# Patient Record
Sex: FEMALE | Race: WHITE | Employment: OTHER | URBAN - METROPOLITAN AREA
[De-identification: names, ages, dates, MRNs, and addresses within clinical notes are randomized per-mention and may not be internally consistent; named-entity substitution may affect disease eponyms.]

---

## 2017-02-07 ENCOUNTER — TELEPHONE (OUTPATIENT)
Dept: FAMILY MEDICINE CLINIC | Facility: CLINIC | Age: 57
End: 2017-02-07

## 2017-02-14 ENCOUNTER — OFFICE VISIT (OUTPATIENT)
Dept: FAMILY MEDICINE CLINIC | Facility: CLINIC | Age: 57
End: 2017-02-14

## 2017-02-14 ENCOUNTER — HOSPITAL ENCOUNTER (OUTPATIENT)
Age: 57
Setting detail: SPECIMEN
Discharge: HOME | End: 2017-02-14
Payer: COMMERCIAL

## 2017-02-14 VITALS
HEART RATE: 63 BPM | DIASTOLIC BLOOD PRESSURE: 80 MMHG | WEIGHT: 126 LBS | SYSTOLIC BLOOD PRESSURE: 120 MMHG | HEIGHT: 66 IN | BODY MASS INDEX: 20.25 KG/M2

## 2017-02-14 DIAGNOSIS — R53.82 CHRONIC FATIGUE: ICD-10-CM

## 2017-02-14 DIAGNOSIS — R42 ORTHOSTATIC LIGHTHEADEDNESS: ICD-10-CM

## 2017-02-14 DIAGNOSIS — E87.6 HYPOKALEMIA: Primary | ICD-10-CM

## 2017-02-14 PROCEDURE — 36415 COLL VENOUS BLD VENIPUNCTURE: CPT | Performed by: FAMILY MEDICINE

## 2017-02-14 PROCEDURE — 99214 OFFICE O/P EST MOD 30 MIN: CPT | Performed by: FAMILY MEDICINE

## 2017-02-14 ASSESSMENT — ENCOUNTER SYMPTOMS
BLOOD IN STOOL: 0
HEARTBURN: 1
COUGH: 0
EYES NEGATIVE: 1
SHORTNESS OF BREATH: 0
CONSTIPATION: 0
NAUSEA: 1
VOMITING: 0
ABDOMINAL PAIN: 0

## 2017-02-15 LAB
ALBUMIN SERPL-MCNC: 5 G/DL (ref 3.5–5.2)
ALBUMIN/GLOBULIN RATIO: 3.1 (ref 1–2.5)
ALP BLD-CCNC: 51 U/L (ref 35–104)
ALT SERPL-CCNC: 17 U/L (ref 5–33)
ANION GAP SERPL CALCULATED.3IONS-SCNC: 16 MMOL/L (ref 9–17)
AST SERPL-CCNC: 24 U/L
BILIRUB SERPL-MCNC: 0.56 MG/DL (ref 0.3–1.2)
BUN BLDV-MCNC: 12 MG/DL (ref 6–20)
BUN/CREAT BLD: ABNORMAL (ref 9–20)
CALCIUM SERPL-MCNC: 9.8 MG/DL (ref 8.6–10.4)
CHLORIDE BLD-SCNC: 101 MMOL/L (ref 98–107)
CO2: 25 MMOL/L (ref 20–31)
CREAT SERPL-MCNC: 0.81 MG/DL (ref 0.5–0.9)
GFR AFRICAN AMERICAN: >60 ML/MIN
GFR NON-AFRICAN AMERICAN: >60 ML/MIN
GFR SERPL CREATININE-BSD FRML MDRD: ABNORMAL ML/MIN/{1.73_M2}
GFR SERPL CREATININE-BSD FRML MDRD: ABNORMAL ML/MIN/{1.73_M2}
GLUCOSE BLD-MCNC: 89 MG/DL (ref 70–99)
POTASSIUM SERPL-SCNC: 4.1 MMOL/L (ref 3.7–5.3)
SODIUM BLD-SCNC: 142 MMOL/L (ref 135–144)
TOTAL PROTEIN: 6.6 G/DL (ref 6.4–8.3)
VITAMIN B-12: 872 PG/ML (ref 211–946)

## 2017-02-16 LAB — VITAMIN D 1,25-DIHYDROXY: 42.4 PG/ML (ref 19.9–79.3)

## 2017-02-23 ENCOUNTER — OFFICE VISIT (OUTPATIENT)
Dept: FAMILY MEDICINE CLINIC | Facility: CLINIC | Age: 57
End: 2017-02-23

## 2017-02-23 ENCOUNTER — HOSPITAL ENCOUNTER (OUTPATIENT)
Age: 57
Setting detail: SPECIMEN
Discharge: HOME | End: 2017-02-23
Payer: COMMERCIAL

## 2017-02-23 VITALS
DIASTOLIC BLOOD PRESSURE: 84 MMHG | WEIGHT: 125 LBS | HEIGHT: 66 IN | BODY MASS INDEX: 20.09 KG/M2 | HEART RATE: 76 BPM | SYSTOLIC BLOOD PRESSURE: 122 MMHG

## 2017-02-23 DIAGNOSIS — R58 BLEEDING: ICD-10-CM

## 2017-02-23 DIAGNOSIS — K64.9 HEMORRHOIDS, UNSPECIFIED HEMORRHOID TYPE: ICD-10-CM

## 2017-02-23 DIAGNOSIS — B37.31 VAGINAL CANDIDIASIS: Primary | ICD-10-CM

## 2017-02-23 LAB
ABSOLUTE EOS #: 0 K/UL (ref 0–0.4)
ABSOLUTE LYMPH #: 1.9 K/UL (ref 1–4.8)
ABSOLUTE MONO #: 0.3 K/UL (ref 0.1–1.2)
BASOPHILS # BLD: 0 % (ref 0–2)
BASOPHILS ABSOLUTE: 0 K/UL (ref 0–0.2)
DIFFERENTIAL TYPE: NORMAL
EOSINOPHILS RELATIVE PERCENT: 1 % (ref 1–4)
HCT VFR BLD CALC: 41 % (ref 36–46)
HEMOGLOBIN: 14 G/DL (ref 12–16)
LYMPHOCYTES # BLD: 31 % (ref 24–44)
MCH RBC QN AUTO: 31.4 PG (ref 26–34)
MCHC RBC AUTO-ENTMCNC: 34.1 G/DL (ref 31–37)
MCV RBC AUTO: 92.3 FL (ref 80–100)
MONOCYTES # BLD: 4 % (ref 2–11)
PDW BLD-RTO: 13.7 % (ref 12.5–15.4)
PLATELET # BLD: 239 K/UL (ref 140–450)
PLATELET ESTIMATE: NORMAL
PMV BLD AUTO: 8.9 FL (ref 6–12)
RBC # BLD: 4.45 M/UL (ref 4–5.2)
RBC # BLD: NORMAL 10*6/UL
SEG NEUTROPHILS: 64 % (ref 36–66)
SEGMENTED NEUTROPHILS ABSOLUTE COUNT: 4 K/UL (ref 1.8–7.7)
WBC # BLD: 6.3 K/UL (ref 3.5–11)
WBC # BLD: NORMAL 10*3/UL

## 2017-02-23 PROCEDURE — 99213 OFFICE O/P EST LOW 20 MIN: CPT | Performed by: NURSE PRACTITIONER

## 2017-02-23 PROCEDURE — 36415 COLL VENOUS BLD VENIPUNCTURE: CPT | Performed by: NURSE PRACTITIONER

## 2017-02-23 RX ORDER — CHOLECALCIFEROL (VITAMIN D3) 125 MCG
500 CAPSULE ORAL DAILY
COMMUNITY
End: 2017-08-10 | Stop reason: ALTCHOICE

## 2017-02-23 RX ORDER — FLUCONAZOLE 100 MG/1
100 TABLET ORAL
Qty: 2 TABLET | Refills: 0 | Status: SHIPPED | OUTPATIENT
Start: 2017-02-23 | End: 2018-01-15 | Stop reason: SDUPTHER

## 2017-02-23 ASSESSMENT — ENCOUNTER SYMPTOMS: RESPIRATORY NEGATIVE: 1

## 2017-06-19 ENCOUNTER — OFFICE VISIT (OUTPATIENT)
Dept: OBGYN CLINIC | Age: 57
End: 2017-06-19
Payer: COMMERCIAL

## 2017-06-19 VITALS
BODY MASS INDEX: 20.63 KG/M2 | SYSTOLIC BLOOD PRESSURE: 112 MMHG | DIASTOLIC BLOOD PRESSURE: 80 MMHG | WEIGHT: 128.4 LBS | HEIGHT: 66 IN

## 2017-06-19 DIAGNOSIS — N95.2 VAGINAL ATROPHY: ICD-10-CM

## 2017-06-19 DIAGNOSIS — N95.0 PMB (POSTMENOPAUSAL BLEEDING): Primary | ICD-10-CM

## 2017-06-19 PROCEDURE — 99213 OFFICE O/P EST LOW 20 MIN: CPT | Performed by: NURSE PRACTITIONER

## 2017-08-10 ENCOUNTER — OFFICE VISIT (OUTPATIENT)
Dept: OBGYN CLINIC | Age: 57
End: 2017-08-10
Payer: COMMERCIAL

## 2017-08-10 VITALS
HEIGHT: 65 IN | WEIGHT: 127 LBS | DIASTOLIC BLOOD PRESSURE: 80 MMHG | BODY MASS INDEX: 21.16 KG/M2 | SYSTOLIC BLOOD PRESSURE: 116 MMHG

## 2017-08-10 DIAGNOSIS — Z12.31 ENCOUNTER FOR SCREENING MAMMOGRAM FOR MALIGNANT NEOPLASM OF BREAST: Primary | ICD-10-CM

## 2017-08-10 PROCEDURE — 99396 PREV VISIT EST AGE 40-64: CPT | Performed by: OBSTETRICS & GYNECOLOGY

## 2017-08-10 RX ORDER — METRONIDAZOLE 7.5 MG/G
GEL TOPICAL 2 TIMES DAILY
COMMUNITY
Start: 2017-05-22 | End: 2021-04-01

## 2017-08-10 ASSESSMENT — ENCOUNTER SYMPTOMS
ANAL BLEEDING: 0
ABDOMINAL DISTENTION: 0
WHEEZING: 0
BACK PAIN: 0
SHORTNESS OF BREATH: 0
CHEST TIGHTNESS: 0
NAUSEA: 0
BLOOD IN STOOL: 0
RECTAL PAIN: 0
PHOTOPHOBIA: 0
COUGH: 0
TROUBLE SWALLOWING: 0
APNEA: 0
CONSTIPATION: 0
SORE THROAT: 0
DIARRHEA: 0
ABDOMINAL PAIN: 0

## 2017-08-18 LAB
ALBUMIN SERPL-MCNC: 4.3 G/DL
ALP BLD-CCNC: 49 U/L
ALT SERPL-CCNC: 37 U/L
ANION GAP SERPL CALCULATED.3IONS-SCNC: NORMAL MMOL/L
AST SERPL-CCNC: 29 U/L
BASOPHILS ABSOLUTE: 0.05 /ΜL
BASOPHILS RELATIVE PERCENT: 1 %
BILIRUB SERPL-MCNC: 0.6 MG/DL (ref 0.1–1.4)
BUN BLDV-MCNC: 14 MG/DL
CALCIUM SERPL-MCNC: 9.4 MG/DL
CHLORIDE BLD-SCNC: 105 MMOL/L
CO2: 27 MMOL/L
CREAT SERPL-MCNC: 0.89 MG/DL
EOSINOPHILS ABSOLUTE: 0.16 /ΜL
EOSINOPHILS RELATIVE PERCENT: 3 %
GFR CALCULATED: 64.9
GLUCOSE BLD-MCNC: 81 MG/DL
HCT VFR BLD CALC: 41.3 % (ref 36–46)
HEMOGLOBIN: 14.2 G/DL (ref 12–16)
LYMPHOCYTES ABSOLUTE: 2.4 /ΜL
LYMPHOCYTES RELATIVE PERCENT: 44 %
MCH RBC QN AUTO: 31.4 PG
MCHC RBC AUTO-ENTMCNC: 34.3 G/DL
MCV RBC AUTO: 91.3 FL
MONOCYTES ABSOLUTE: 0.27 /ΜL
MONOCYTES RELATIVE PERCENT: 5 %
NEUTROPHILS ABSOLUTE: 2.57 /ΜL
NEUTROPHILS RELATIVE PERCENT: 47 %
PDW BLD-RTO: NORMAL %
PLATELET # BLD: 264 K/ΜL
PMV BLD AUTO: NORMAL FL
POTASSIUM SERPL-SCNC: 4.3 MMOL/L
RBC # BLD: 4.53 10^6/ΜL
SODIUM BLD-SCNC: 142 MMOL/L
TOTAL PROTEIN: 6.7
TSH SERPL DL<=0.05 MIU/L-ACNC: 2.01 UIU/ML
VITAMIN D 25-HYDROXY: 28.9
VITAMIN D2, 25 HYDROXY: NORMAL
VITAMIN D3,25 HYDROXY: NORMAL
WBC # BLD: 5.46 10^3/ML

## 2017-08-21 DIAGNOSIS — Z12.31 ENCOUNTER FOR SCREENING MAMMOGRAM FOR MALIGNANT NEOPLASM OF BREAST: ICD-10-CM

## 2017-09-11 ENCOUNTER — HOSPITAL ENCOUNTER (OUTPATIENT)
Dept: MAMMOGRAPHY | Age: 57
Discharge: HOME | End: 2017-09-11
Payer: COMMERCIAL

## 2017-09-11 DIAGNOSIS — Z12.31 ENCOUNTER FOR SCREENING MAMMOGRAM FOR MALIGNANT NEOPLASM OF BREAST: ICD-10-CM

## 2017-09-11 PROCEDURE — 77063 BREAST TOMOSYNTHESIS BI: CPT

## 2017-12-18 ENCOUNTER — OFFICE VISIT (OUTPATIENT)
Dept: FAMILY MEDICINE CLINIC | Age: 57
End: 2017-12-18
Payer: COMMERCIAL

## 2017-12-18 VITALS
BODY MASS INDEX: 20.41 KG/M2 | WEIGHT: 127 LBS | HEART RATE: 88 BPM | HEIGHT: 66 IN | TEMPERATURE: 99.6 F | DIASTOLIC BLOOD PRESSURE: 72 MMHG | SYSTOLIC BLOOD PRESSURE: 108 MMHG

## 2017-12-18 DIAGNOSIS — Z72.89 OTHER PROBLEMS RELATED TO LIFESTYLE: ICD-10-CM

## 2017-12-18 DIAGNOSIS — Z13.228 SCREENING FOR METABOLIC DISORDER: ICD-10-CM

## 2017-12-18 DIAGNOSIS — E87.6 HYPOKALEMIA: ICD-10-CM

## 2017-12-18 DIAGNOSIS — Z13.220 SCREENING FOR HYPERLIPIDEMIA: ICD-10-CM

## 2017-12-18 DIAGNOSIS — T36.95XA ANTIBIOTIC-INDUCED YEAST INFECTION: ICD-10-CM

## 2017-12-18 DIAGNOSIS — J01.10 ACUTE NON-RECURRENT FRONTAL SINUSITIS: Primary | ICD-10-CM

## 2017-12-18 DIAGNOSIS — B37.9 ANTIBIOTIC-INDUCED YEAST INFECTION: ICD-10-CM

## 2017-12-18 PROCEDURE — 99213 OFFICE O/P EST LOW 20 MIN: CPT | Performed by: NURSE PRACTITIONER

## 2017-12-18 PROCEDURE — G8427 DOCREV CUR MEDS BY ELIG CLIN: HCPCS | Performed by: NURSE PRACTITIONER

## 2017-12-18 PROCEDURE — G8484 FLU IMMUNIZE NO ADMIN: HCPCS | Performed by: NURSE PRACTITIONER

## 2017-12-18 PROCEDURE — 3014F SCREEN MAMMO DOC REV: CPT | Performed by: NURSE PRACTITIONER

## 2017-12-18 PROCEDURE — 3017F COLORECTAL CA SCREEN DOC REV: CPT | Performed by: NURSE PRACTITIONER

## 2017-12-18 PROCEDURE — G8420 CALC BMI NORM PARAMETERS: HCPCS | Performed by: NURSE PRACTITIONER

## 2017-12-18 PROCEDURE — 1036F TOBACCO NON-USER: CPT | Performed by: NURSE PRACTITIONER

## 2017-12-18 RX ORDER — DOXYCYCLINE HYCLATE 100 MG
100 TABLET ORAL 2 TIMES DAILY
Qty: 20 TABLET | Refills: 0 | Status: SHIPPED | OUTPATIENT
Start: 2017-12-18 | End: 2018-01-15 | Stop reason: SDUPTHER

## 2017-12-18 RX ORDER — FLUCONAZOLE 100 MG/1
100 TABLET ORAL
Qty: 2 TABLET | Refills: 1 | Status: SHIPPED | OUTPATIENT
Start: 2017-12-18 | End: 2017-12-22

## 2017-12-18 ASSESSMENT — ENCOUNTER SYMPTOMS
SHORTNESS OF BREATH: 0
SINUS PRESSURE: 1
SINUS COMPLAINT: 1
SORE THROAT: 1
SINUS PAIN: 1
COUGH: 1

## 2017-12-18 NOTE — PROGRESS NOTES
Date    Acid reflux     Fracture of thumb     20's    Hypokalemia        FAMILY HISTORY    Family History   Problem Relation Age of Onset    Heart Disease Mother      CABGx 4 , [place]   Lilli March Hypertension Mother    Lilli March Arthritis Mother     Dementia Mother     Osteoporosis Mother     Cervical Cancer Maternal Grandmother     Cancer Paternal Grandfather      throat    Hypertension Brother     Heart Disease Brother     Asthma Brother        SOCIAL HISTORY    Social History     Social History    Marital status:      Spouse name: N/A    Number of children: N/A    Years of education: N/A     Social History Main Topics    Smoking status: Never Smoker    Smokeless tobacco: Never Used    Alcohol use Yes    Drug use: No    Sexual activity: Yes     Birth control/ protection: Surgical      Comment: hyst     Other Topics Concern    None     Social History Narrative    None       SURGICAL HISTORY    Past Surgical History:   Procedure Laterality Date    BLADDER REMOVAL  1986    x2    BREAST ENHANCEMENT SURGERY Bilateral      implants placed under muscle    CATARACT REMOVAL Bilateral 10/2017     SECTION  1984, 1989    x 2    DILATION AND CURETTAGE OF UTERUS  1981    miscarriages    DILATION AND CURETTAGE OF UTERUS  1998    dsyplasia    HERNIA REPAIR      HYSTERECTOMY, TOTAL ABDOMINAL  1998    RHINOPLASTY  1998    TONSILLECTOMY AND ADENOIDECTOMY      1 yo       CURRENT MEDICATIONS    Current Outpatient Prescriptions   Medication Sig Dispense Refill    Omega-3 Fatty Acids (OMEGA 3 PO) Take by mouth      Probiotic Product (PROBIOTIC PO) Take by mouth      COLLAGEN PO Take by mouth      doxycycline hyclate (VIBRA-TABS) 100 MG tablet Take 1 tablet by mouth 2 times daily 20 tablet 0    fluconazole (DIFLUCAN) 100 MG tablet Take 1 tablet by mouth every 48 hours for 4 days 2 tablet 1    metroNIDAZOLE (METROGEL) 0.75 % gel       Potassium 99 MG TABS Take by mouth      Ibuprofen (ADVIL PO) Take 600 mg by mouth 3 times daily       Magnesium Citrate 100 MG TABS Take by mouth      aspirin 81 MG tablet Take 81 mg by mouth daily      Multiple Vitamins-Minerals (THERAPEUTIC MULTIVITAMIN-MINERALS) tablet Take 1 tablet by mouth daily       No current facility-administered medications for this visit. ALLERGIES    Allergies   Allergen Reactions    Macrodantin [Nitrofurantoin] Hives    Sulfa Antibiotics Hives and Other (See Comments)     Itchy throat       PHYSICAL EXAM   Physical Exam   Constitutional: She is oriented to person, place, and time. She appears well-developed and well-nourished. HENT:   Head: Normocephalic and atraumatic. Right Ear: Hearing normal. A middle ear effusion is present. Left Ear: Hearing and tympanic membrane normal.   Nose: Right sinus exhibits frontal sinus tenderness. Left sinus exhibits maxillary sinus tenderness and frontal sinus tenderness. Mouth/Throat: Uvula is midline. Posterior oropharyngeal erythema (mild, visible PND) present. R TM view partially obstructed by cerumen; mild effusion noted   Eyes: Pupils are equal, round, and reactive to light. Neck: Normal range of motion. Cardiovascular: Normal rate, regular rhythm and normal heart sounds. Pulmonary/Chest: Effort normal and breath sounds normal.   Musculoskeletal: Normal range of motion. Lymphadenopathy:     She has no cervical adenopathy. Neurological: She is alert and oriented to person, place, and time. Skin: Skin is warm and dry. Nursing note and vitals reviewed. Assessment/PLan  1. Acute non-recurrent frontal sinusitis    -rest, fluids, humidified air, saline nasal spray  - doxycycline hyclate (VIBRA-TABS) 100 MG tablet; Take 1 tablet by mouth 2 times daily  Dispense: 20 tablet; Refill: 0    2. Antibiotic-induced yeast infection    - fluconazole (DIFLUCAN) 100 MG tablet; Take 1 tablet by mouth every 48 hours for 4 days  Dispense: 2 tablet; Refill: 1    3.  Hypokalemia    - Comprehensive Metabolic Panel; Future    4. Screening for metabolic disorder    - Comprehensive Metabolic Panel; Future    5. Other problems related to lifestyle    - Hepatitis C Antibody; Future    6. Screening for hyperlipidemia    - Lipid, Fasting; Future      Louis Allred received counseling on the following healthy behaviors: nutrition, exercise and medication adherence  Reviewed prior labs and health maintenance. Continue current medications, diet and exercise. Discussed use, benefit, and side effects of prescribed medications. Barriers to medication compliance addressed. Patient given educational materials - see patient instructions. All patient questions answered. Patient voiced understanding. Return if symptoms worsen or fail to improve.         Electronically signed by Juana Shaw NP on 12/18/17 at 11:20 AM

## 2018-01-15 ENCOUNTER — PATIENT MESSAGE (OUTPATIENT)
Dept: FAMILY MEDICINE CLINIC | Age: 58
End: 2018-01-15

## 2018-01-15 DIAGNOSIS — J01.10 ACUTE NON-RECURRENT FRONTAL SINUSITIS: ICD-10-CM

## 2018-01-15 DIAGNOSIS — B37.31 VAGINAL CANDIDIASIS: ICD-10-CM

## 2018-01-15 RX ORDER — FLUCONAZOLE 100 MG/1
100 TABLET ORAL
Qty: 2 TABLET | Refills: 0 | Status: SHIPPED | OUTPATIENT
Start: 2018-01-15 | End: 2018-01-19

## 2018-01-15 RX ORDER — DOXYCYCLINE HYCLATE 100 MG
100 TABLET ORAL 2 TIMES DAILY
Qty: 28 TABLET | Refills: 0 | Status: SHIPPED | OUTPATIENT
Start: 2018-01-15 | End: 2018-08-31 | Stop reason: CLARIF

## 2018-01-18 ENCOUNTER — HOSPITAL ENCOUNTER (OUTPATIENT)
Age: 58
Setting detail: SPECIMEN
Discharge: HOME | End: 2018-01-18
Payer: COMMERCIAL

## 2018-01-18 DIAGNOSIS — Z13.220 SCREENING FOR HYPERLIPIDEMIA: ICD-10-CM

## 2018-01-18 DIAGNOSIS — Z13.228 SCREENING FOR METABOLIC DISORDER: ICD-10-CM

## 2018-01-18 DIAGNOSIS — Z72.89 OTHER PROBLEMS RELATED TO LIFESTYLE: ICD-10-CM

## 2018-01-18 DIAGNOSIS — E87.6 HYPOKALEMIA: ICD-10-CM

## 2018-01-18 LAB
ALBUMIN SERPL-MCNC: 4.7 G/DL (ref 3.5–5.2)
ALBUMIN/GLOBULIN RATIO: 2 (ref 1–2.5)
ALP BLD-CCNC: 51 U/L (ref 35–104)
ALT SERPL-CCNC: 24 U/L (ref 5–33)
ANION GAP SERPL CALCULATED.3IONS-SCNC: 16 MMOL/L (ref 9–17)
AST SERPL-CCNC: 26 U/L
BILIRUB SERPL-MCNC: 0.69 MG/DL (ref 0.3–1.2)
BUN BLDV-MCNC: 13 MG/DL (ref 6–20)
BUN/CREAT BLD: ABNORMAL (ref 9–20)
CALCIUM SERPL-MCNC: 9.8 MG/DL (ref 8.6–10.4)
CHLORIDE BLD-SCNC: 105 MMOL/L (ref 98–107)
CHOLESTEROL, FASTING: 213 MG/DL
CHOLESTEROL/HDL RATIO: 2.5
CO2: 25 MMOL/L (ref 20–31)
CREAT SERPL-MCNC: 0.9 MG/DL (ref 0.5–0.9)
GFR AFRICAN AMERICAN: >60 ML/MIN
GFR NON-AFRICAN AMERICAN: >60 ML/MIN
GFR SERPL CREATININE-BSD FRML MDRD: ABNORMAL ML/MIN/{1.73_M2}
GFR SERPL CREATININE-BSD FRML MDRD: ABNORMAL ML/MIN/{1.73_M2}
GLUCOSE BLD-MCNC: 77 MG/DL (ref 70–99)
HDLC SERPL-MCNC: 85 MG/DL
HEPATITIS C ANTIBODY: NONREACTIVE
LDL CHOLESTEROL: 119 MG/DL (ref 0–130)
POTASSIUM SERPL-SCNC: 4.7 MMOL/L (ref 3.7–5.3)
SODIUM BLD-SCNC: 146 MMOL/L (ref 135–144)
TOTAL PROTEIN: 7.1 G/DL (ref 6.4–8.3)
TRIGLYCERIDE, FASTING: 43 MG/DL
VLDLC SERPL CALC-MCNC: ABNORMAL MG/DL (ref 1–30)

## 2018-02-12 ENCOUNTER — TELEPHONE (OUTPATIENT)
Dept: FAMILY MEDICINE CLINIC | Age: 58
End: 2018-02-12

## 2018-02-12 DIAGNOSIS — Z01.00 EYE EXAM, ROUTINE: Primary | ICD-10-CM

## 2018-03-12 ENCOUNTER — TELEPHONE (OUTPATIENT)
Dept: OBGYN CLINIC | Age: 58
End: 2018-03-12

## 2018-03-12 NOTE — TELEPHONE ENCOUNTER
patient calling for refil on lotrisone she states she has been waiting for this for 2-3 weeks .  Advised we received this on 03/05/18 and not sure why it is stillpending will discuss with Dr Elif Miller when she is back in the office 03/14/18    Please call patient once taken care of [phone]

## 2018-03-14 RX ORDER — CLOTRIMAZOLE AND BETAMETHASONE DIPROPIONATE 10; .64 MG/G; MG/G
CREAM TOPICAL
Qty: 30 G | Refills: 5 | Status: SHIPPED | OUTPATIENT
Start: 2018-03-14 | End: 2018-08-31 | Stop reason: CLARIF

## 2018-04-02 ENCOUNTER — TELEPHONE (OUTPATIENT)
Dept: FAMILY MEDICINE CLINIC | Age: 58
End: 2018-04-02

## 2018-04-02 DIAGNOSIS — D22.62 NEVUS OF LEFT SHOULDER: Primary | ICD-10-CM

## 2018-08-15 ENCOUNTER — OFFICE VISIT (OUTPATIENT)
Dept: OBGYN CLINIC | Age: 58
End: 2018-08-15
Payer: COMMERCIAL

## 2018-08-15 VITALS
WEIGHT: 125.6 LBS | SYSTOLIC BLOOD PRESSURE: 118 MMHG | HEIGHT: 66 IN | DIASTOLIC BLOOD PRESSURE: 80 MMHG | BODY MASS INDEX: 20.18 KG/M2

## 2018-08-15 DIAGNOSIS — Z01.419 WELL FEMALE EXAM WITH ROUTINE GYNECOLOGICAL EXAM: Primary | ICD-10-CM

## 2018-08-15 DIAGNOSIS — N94.19 DYSPAREUNIA DUE TO MEDICAL CONDITION IN FEMALE: ICD-10-CM

## 2018-08-15 DIAGNOSIS — Z12.31 ENCOUNTER FOR SCREENING MAMMOGRAM FOR MALIGNANT NEOPLASM OF BREAST: ICD-10-CM

## 2018-08-15 PROCEDURE — 99396 PREV VISIT EST AGE 40-64: CPT | Performed by: OBSTETRICS & GYNECOLOGY

## 2018-08-15 RX ORDER — CEPHALEXIN 250 MG/1
250 CAPSULE ORAL SEE ADMIN INSTRUCTIONS
Qty: 30 CAPSULE | Refills: 3 | Status: SHIPPED | OUTPATIENT
Start: 2018-08-15 | End: 2018-08-31 | Stop reason: CLARIF

## 2018-08-15 ASSESSMENT — ENCOUNTER SYMPTOMS
RECTAL PAIN: 0
DIARRHEA: 0
WHEEZING: 0
ABDOMINAL DISTENTION: 0
ANAL BLEEDING: 0
CHEST TIGHTNESS: 0
CONSTIPATION: 0
BACK PAIN: 0
APNEA: 0
TROUBLE SWALLOWING: 0
NAUSEA: 0
ABDOMINAL PAIN: 0
SORE THROAT: 0
COUGH: 0
SHORTNESS OF BREATH: 0
PHOTOPHOBIA: 0
BLOOD IN STOOL: 0

## 2018-08-15 NOTE — PROGRESS NOTES
Eric Medina is a 59 y.o. [id number], here for her annual exam.  The patient was seen and examined. The patients past medical, surgical, social and family history were reviewed. Current medications and allergies were reviewed, and documented in the chart. HPI  No new medical problems. No gyn complaints except dyspareunia. Lubricants are not helping and patient is avoiding sex because it is too painful. Tried estrogen cream previously and did not think it helped. Discussed options; patient willing to try Intrarosa and if that is ineffective she will try osphena. She will try the intrarosa for 3 months and will call to request continuing refills or a change. Menopausal history: denies bleeding, pain. Blood pressure 118/80, height 5' 5.75\" (1.67 m), weight 125 lb 9.6 oz (57 kg), not currently breastfeeding.   Wt Readings from Last 3 Encounters:   08/15/18 125 lb 9.6 oz (57 kg)   12/18/17 127 lb (57.6 kg)   08/10/17 127 lb (57.6 kg)     Recent Results (from the past 8736 hour(s))   Vitamin D 25 Hydroxy    Collection Time: 08/18/17 12:00 AM   Result Value Ref Range    Vit D, 25-Hydroxy 28.9     Vitamin D3,25 Hydroxy      Vitamin D2, 25 Hydroxy     TSH with Reflex    Collection Time: 08/18/17 12:00 AM   Result Value Ref Range    TSH 2.01 uIU/mL   CBC With Auto Differential    Collection Time: 08/18/17 12:00 AM   Result Value Ref Range    WBC 5.46 10^3/mL    RBC 4.53 10^6/µL    Hemoglobin 14.2 12.0 - 16.0 g/dL    Hematocrit 41.3 36 - 46 %    MCV 91.3 fL    MCH 31.4 pg    MCHC 34.3 g/dL    Platelets 221 K/µL    RDW  %    MPV  fL    Neutrophils % 47 %    Lymphocytes % 44 %    Monocytes % 5 %    Eosinophils % 3 %    Basophils % 1 %    Neutrophils # 2.57 /µL    Lymphocytes # 2.40 /µL    Monocytes # 0.27 /µL    Eosinophils # 0.16 /µL    Basophils # 0.05 /µL   Comprehensive Metabolic Panel    Collection Time: 08/18/17 12:00 AM   Result Value Ref Range    Sodium 142 mmol/L    Chloride 105 mmol/L    Potassium 4.3 mmol/L    BUN 14 mg/dL    CREATININE 0.89     Glucose 81 mg/dL    AST 29 U/L    ALT 37 U/L    Calcium 9.4 mg/dL    Total Protein 6.7     CO2 27 mmol/L    Alb 4.3     Alkaline Phosphatase 49 U/L    Total Bilirubin 0.6 0.1 - 1.4 mg/dL    Gfr Calculated 64.9     Anion Gap  mmol/L   Comprehensive Metabolic Panel    Collection Time: 18  9:45 AM   Result Value Ref Range    Glucose 77 70 - 99 mg/dL    BUN 13 6 - 20 mg/dL    CREATININE 0.90 0.50 - 0.90 mg/dL    Bun/Cre Ratio NOT REPORTED 9 - 20    Calcium 9.8 8.6 - 10.4 mg/dL    Sodium 146 (H) 135 - 144 mmol/L    Potassium 4.7 3.7 - 5.3 mmol/L    Chloride 105 98 - 107 mmol/L    CO2 25 20 - 31 mmol/L    Anion Gap 16 9 - 17 mmol/L    Alkaline Phosphatase 51 35 - 104 U/L    ALT 24 5 - 33 U/L    AST 26 <32 U/L    Total Bilirubin 0.69 0.3 - 1.2 mg/dL    Total Protein 7.1 6.4 - 8.3 g/dL    Alb 4.7 3.5 - 5.2 g/dL    Albumin/Globulin Ratio 2.0 1.0 - 2.5    GFR Non-African American >60 >60 mL/min    GFR African American >60 >60 mL/min    GFR Comment          GFR Staging NOT REPORTED    Lipid, Fasting    Collection Time: 18  9:45 AM   Result Value Ref Range    Cholesterol, Fasting 213 (H) <200 mg/dL    HDL 85 >40 mg/dL    LDL Cholesterol 119 0 - 130 mg/dL    Chol/HDL Ratio 2.5 <5    Triglyceride, Fasting 43 <150 mg/dL    VLDL NOT REPORTED 1 - 30 mg/dL   Hepatitis C Antibody    Collection Time: 18  9:45 AM   Result Value Ref Range    Hepatitis C Ab NONREACTIVE NR     Past Medical History:   Diagnosis Date    Acid reflux     Fracture of thumb     20's    Hypokalemia                                                                    Past Surgical History:   Procedure Laterality Date    BLADDER REMOVAL       cysts removed x 2    BREAST ENHANCEMENT SURGERY Bilateral      implants placed under muscle    CATARACT REMOVAL Bilateral 10/2017     SECTION  1984, 1989    x 2    COLONOSCOPY      negative    DILATION AND CURETTAGE OF UTERUS   miscarriages    DILATION AND CURETTAGE OF UTERUS  1998    dsyplasia    HERNIA REPAIR  2010    HYSTERECTOMY, TOTAL ABDOMINAL  1998    RHINOPLASTY  1998    TONSILLECTOMY AND ADENOIDECTOMY      1 yo     Family History   Problem Relation Age of Onset    Heart Disease Mother         CABGx 3 , [place]   Ana Paula [facility] Hypertension Mother    Ana Paula Al[place] Arthritis Mother     Dementia Mother     Osteoporosis Mother     Cervical Cancer Maternal Grandmother     Cancer Paternal Grandfather         throat    Hypertension Brother     Heart Disease Brother     Asthma Brother      History   Smoking Status    Never Smoker   Smokeless Tobacco    Never Used     History   Alcohol Use    Yes       MEDICATIONS:  Current Outpatient Prescriptions   Medication Sig Dispense Refill    NONFORMULARY Acid ease prn      clotrimazole-betamethasone (LOTRISONE) 1-0.05 % cream apply to affected area twice a day 30 g 5    Omega-3 Fatty Acids (OMEGA 3 PO) Take by mouth      Probiotic Product (PROBIOTIC PO) Take by mouth      COLLAGEN PO Take by mouth      metroNIDAZOLE (METROGEL) 0.75 % gel       Potassium 99 MG TABS Take by mouth      Ibuprofen (ADVIL PO) Take 600 mg by mouth 3 times daily       Magnesium Citrate 100 MG TABS Take by mouth      aspirin 81 MG tablet Take 81 mg by mouth daily      Multiple Vitamins-Minerals (THERAPEUTIC MULTIVITAMIN-MINERALS) tablet Take 1 tablet by mouth daily      doxycycline hyclate (VIBRA-TABS) 100 MG tablet Take 1 tablet by mouth 2 times daily 28 tablet 0     No current facility-administered medications for this visit. ALLERGIES:  Macrodantin [nitrofurantoin] and Sulfa antibiotics    Review of Systems   Constitutional: Negative for activity change, appetite change, fatigue, fever and unexpected weight change. HENT: Negative for congestion, dental problem, hearing loss, mouth sores, sore throat and trouble swallowing. Eyes: Negative for photophobia and visual disturbance.    Respiratory: Negative for apnea, cough, chest tightness, shortness of breath and wheezing. Cardiovascular: Negative for chest pain, palpitations and leg swelling. Gastrointestinal: Negative for abdominal distention, abdominal pain, anal bleeding, blood in stool, constipation, diarrhea, nausea and rectal pain. Endocrine: Negative for cold intolerance, heat intolerance, polydipsia, polyphagia and polyuria. Genitourinary: Negative for difficulty urinating, dyspareunia, dysuria, flank pain, frequency, genital sores, hematuria, menstrual problem, pelvic pain, urgency, vaginal bleeding and vaginal discharge. Musculoskeletal: Negative for arthralgias, back pain and myalgias. Skin: Negative for pallor, rash and wound. Allergic/Immunologic: Negative for environmental allergies, food allergies and immunocompromised state. Neurological: Negative for dizziness, seizures, syncope, weakness, numbness and headaches. Hematological: Negative for adenopathy. Psychiatric/Behavioral: Negative for agitation, behavioral problems, confusion, decreased concentration, dysphoric mood, hallucinations, sleep disturbance and suicidal ideas. The patient is not nervous/anxious and is not hyperactive.        ______________________________________________________________________  Physical Exam   Constitutional: She is oriented to person, place, and time. She appears well-developed and well-nourished. HENT:   Head: Normocephalic and atraumatic. Eyes: Pupils are equal, round, and reactive to light. EOM are normal.   Neck: Normal range of motion. Neck supple. No tracheal deviation present. No thyromegaly present. Cardiovascular: Normal rate, regular rhythm and normal heart sounds. No murmur heard. Pulmonary/Chest: Effort normal and breath sounds normal. She has no wheezes. She exhibits no tenderness. Abdominal: Soft. Bowel sounds are normal. She exhibits no distension and no mass. There is no tenderness. There is no rebound and no guarding.

## 2018-08-17 ENCOUNTER — HOSPITAL ENCOUNTER (OUTPATIENT)
Age: 58
Setting detail: SPECIMEN
Discharge: HOME | End: 2018-08-17
Payer: COMMERCIAL

## 2018-08-17 DIAGNOSIS — Z01.419 WELL FEMALE EXAM WITH ROUTINE GYNECOLOGICAL EXAM: ICD-10-CM

## 2018-08-17 LAB
ABSOLUTE EOS #: 0.08 K/UL (ref 0–0.44)
ABSOLUTE IMMATURE GRANULOCYTE: <0.03 K/UL (ref 0–0.3)
ABSOLUTE LYMPH #: 3.11 K/UL (ref 1.1–3.7)
ABSOLUTE MONO #: 0.38 K/UL (ref 0.1–1.2)
ALBUMIN SERPL-MCNC: 4.5 G/DL (ref 3.5–5.2)
ALBUMIN/GLOBULIN RATIO: 1.8 (ref 1–2.5)
ALP BLD-CCNC: 55 U/L (ref 35–104)
ALT SERPL-CCNC: 18 U/L (ref 5–33)
ANION GAP SERPL CALCULATED.3IONS-SCNC: 12 MMOL/L (ref 9–17)
AST SERPL-CCNC: 26 U/L
BASOPHILS # BLD: 1 % (ref 0–2)
BASOPHILS ABSOLUTE: 0.06 K/UL (ref 0–0.2)
BILIRUB SERPL-MCNC: 0.58 MG/DL (ref 0.3–1.2)
BUN BLDV-MCNC: 16 MG/DL (ref 8–23)
BUN/CREAT BLD: NORMAL (ref 9–20)
CALCIUM SERPL-MCNC: 9.4 MG/DL (ref 8.6–10.4)
CHLORIDE BLD-SCNC: 103 MMOL/L (ref 98–107)
CHOLESTEROL/HDL RATIO: 2.3
CHOLESTEROL: 249 MG/DL
CO2: 27 MMOL/L (ref 20–31)
CREAT SERPL-MCNC: 0.79 MG/DL (ref 0.5–0.9)
DIFFERENTIAL TYPE: ABNORMAL
EOSINOPHILS RELATIVE PERCENT: 1 % (ref 1–4)
GFR AFRICAN AMERICAN: >60 ML/MIN
GFR NON-AFRICAN AMERICAN: >60 ML/MIN
GFR SERPL CREATININE-BSD FRML MDRD: NORMAL ML/MIN/{1.73_M2}
GFR SERPL CREATININE-BSD FRML MDRD: NORMAL ML/MIN/{1.73_M2}
GLUCOSE BLD-MCNC: 85 MG/DL (ref 70–99)
HCT VFR BLD CALC: 47.4 % (ref 36.3–47.1)
HDLC SERPL-MCNC: 107 MG/DL
HEMOGLOBIN: 15.3 G/DL (ref 11.9–15.1)
IMMATURE GRANULOCYTES: 0 %
LDL CHOLESTEROL: 129 MG/DL (ref 0–130)
LYMPHOCYTES # BLD: 53 % (ref 24–43)
MCH RBC QN AUTO: 31.5 PG (ref 25.2–33.5)
MCHC RBC AUTO-ENTMCNC: 32.3 G/DL (ref 28.4–34.8)
MCV RBC AUTO: 97.7 FL (ref 82.6–102.9)
MONOCYTES # BLD: 7 % (ref 3–12)
NRBC AUTOMATED: 0 PER 100 WBC
PDW BLD-RTO: 12.7 % (ref 11.8–14.4)
PLATELET # BLD: 270 K/UL (ref 138–453)
PLATELET ESTIMATE: ABNORMAL
PMV BLD AUTO: 10.5 FL (ref 8.1–13.5)
POTASSIUM SERPL-SCNC: 4.2 MMOL/L (ref 3.7–5.3)
RBC # BLD: 4.85 M/UL (ref 3.95–5.11)
RBC # BLD: ABNORMAL 10*6/UL
SEG NEUTROPHILS: 38 % (ref 36–65)
SEGMENTED NEUTROPHILS ABSOLUTE COUNT: 2.18 K/UL (ref 1.5–8.1)
SODIUM BLD-SCNC: 142 MMOL/L (ref 135–144)
THYROXINE, FREE: 1.33 NG/DL (ref 0.93–1.7)
TOTAL PROTEIN: 7 G/DL (ref 6.4–8.3)
TRIGL SERPL-MCNC: 63 MG/DL
TSH SERPL DL<=0.05 MIU/L-ACNC: 3.13 MIU/L (ref 0.3–5)
VLDLC SERPL CALC-MCNC: ABNORMAL MG/DL (ref 1–30)
WBC # BLD: 5.8 K/UL (ref 3.5–11.3)
WBC # BLD: ABNORMAL 10*3/UL

## 2018-08-20 ENCOUNTER — TELEPHONE (OUTPATIENT)
Dept: FAMILY MEDICINE CLINIC | Age: 58
End: 2018-08-20

## 2018-08-20 NOTE — TELEPHONE ENCOUNTER
The referrals that are needed are insurance referrals one for [facility] 10/06/2018 [facility] prior to the appointments  10/05/2018

## 2018-08-20 NOTE — TELEPHONE ENCOUNTER
Pt is asking for a referral to an Eye Doctor and Dermatologist      Health Maintenance   Topic Date Due    Shingles Vaccine (1 of 2 - 2 Dose Series) 06/13/2008    Flu vaccine (1) 12/18/2018 (Originally 9/1/2018)    HIV screen  12/18/2018 (Originally 6/13/1975)    Breast cancer screen  09/11/2019    DTaP/Tdap/Td vaccine (2 - Td) 01/01/2021    Lipid screen  08/17/2023    Colon cancer screen colonoscopy  05/23/2026    Hepatitis C screen  Completed             (applicable per patient's age: Cancer Screenings, Depression Screening, Fall Risk Screening, Immunizations)    LDL Cholesterol (mg/dL)   Date Value   08/17/2018 129     AST (U/L)   Date Value   08/17/2018 26     ALT (U/L)   Date Value   08/17/2018 18     BUN (mg/dL)   Date Value   08/17/2018 16      (goal A1C is < 7)   (goal LDL is <100) need 30-50% reduction from baseline     BP Readings from Last 3 Encounters:   08/15/18 118/80   12/18/17 108/72   08/10/17 116/80    (goal /80)      All Future Testing planned in [facility]:  Lab Frequency Next Occurrence   [facility]ICK[facility] DIGITAL SCREEN W CAD BILATERAL Once 08/15/2018       Next Visit Date:  Future Appointments  Date Time Provider Shayna Garcia   9/17/2018 11:00 AM STA SCR MAMMO RM 2 STAZ MAMMO STA Radiolog   8/21/2019 1:00 PM MD Cathy Sanchez [facility]            Patient Active Problem List:     Herpes zoster without complication     Hypokalemia     Orthostatic lightheadedness     Chronic fatigue

## 2018-08-31 ENCOUNTER — OFFICE VISIT (OUTPATIENT)
Dept: FAMILY MEDICINE CLINIC | Age: 58
End: 2018-08-31
Payer: COMMERCIAL

## 2018-08-31 VITALS
WEIGHT: 124 LBS | BODY MASS INDEX: 20.17 KG/M2 | DIASTOLIC BLOOD PRESSURE: 72 MMHG | HEART RATE: 66 BPM | SYSTOLIC BLOOD PRESSURE: 102 MMHG

## 2018-08-31 DIAGNOSIS — D58.2 ELEVATED HEMOGLOBIN (HCC): Primary | ICD-10-CM

## 2018-08-31 DIAGNOSIS — J31.0 CHRONIC RHINITIS: ICD-10-CM

## 2018-08-31 DIAGNOSIS — E55.9 VITAMIN D DEFICIENCY: ICD-10-CM

## 2018-08-31 DIAGNOSIS — S02.2XXS CLOSED FRACTURE OF NASAL BONE, SEQUELA: ICD-10-CM

## 2018-08-31 PROCEDURE — 3017F COLORECTAL CA SCREEN DOC REV: CPT | Performed by: FAMILY MEDICINE

## 2018-08-31 PROCEDURE — G8427 DOCREV CUR MEDS BY ELIG CLIN: HCPCS | Performed by: FAMILY MEDICINE

## 2018-08-31 PROCEDURE — G8420 CALC BMI NORM PARAMETERS: HCPCS | Performed by: FAMILY MEDICINE

## 2018-08-31 PROCEDURE — 99214 OFFICE O/P EST MOD 30 MIN: CPT | Performed by: FAMILY MEDICINE

## 2018-08-31 PROCEDURE — 1036F TOBACCO NON-USER: CPT | Performed by: FAMILY MEDICINE

## 2018-08-31 RX ORDER — FLUTICASONE PROPIONATE 50 MCG
1 SPRAY, SUSPENSION (ML) NASAL DAILY
Qty: 1 BOTTLE | Refills: 3 | Status: SHIPPED | OUTPATIENT
Start: 2018-08-31 | End: 2019-02-07 | Stop reason: ALTCHOICE

## 2018-08-31 RX ORDER — LANOLIN ALCOHOL/MO/W.PET/CERES
3 CREAM (GRAM) TOPICAL NIGHTLY PRN
Status: ON HOLD | COMMUNITY
End: 2019-08-14 | Stop reason: ALTCHOICE

## 2018-08-31 ASSESSMENT — PATIENT HEALTH QUESTIONNAIRE - PHQ9
2. FEELING DOWN, DEPRESSED OR HOPELESS: 0
1. LITTLE INTEREST OR PLEASURE IN DOING THINGS: 0
SUM OF ALL RESPONSES TO PHQ9 QUESTIONS 1 & 2: 0
SUM OF ALL RESPONSES TO PHQ QUESTIONS 1-9: 0
SUM OF ALL RESPONSES TO PHQ QUESTIONS 1-9: 0

## 2018-08-31 ASSESSMENT — ENCOUNTER SYMPTOMS
SINUS PRESSURE: 1
ABDOMINAL PAIN: 0
EYES NEGATIVE: 1
COUGH: 0
SINUS COMPLAINT: 1
SHORTNESS OF BREATH: 0
SINUS PAIN: 1

## 2018-08-31 NOTE — PROGRESS NOTES
[facility] PHYSICIANS  MADINA BROTHERS [facility] [address] [facility] PRACTICE  [address] [address]  Dept: [phone]    8/31/2018    CHIEF COMPLAINT    Chief Complaint   Patient presents with    Results     lab       HPI    Dea Nunes is a 59 y.o. female who presents   Chief Complaint   Patient presents with   [facility] Results     lab   . Recheck labs after being seen by Dr. Minerva Brooks. She had an elevated hgb, hct and lymphocytes. She has never been a smoker. Admits to not staying well hydrated. Hx of prior nasal surgery and sinus surgery with Dr. Randa Rowan. Now having pain and swelling left maxillary and frontal sinuses. Sinus Problem   This is a recurrent problem. The current episode started more than 1 month ago. The problem has been gradually worsening since onset. There has been no fever. The pain is moderate. Associated symptoms include congestion, headaches (left frontal) and sinus pressure. Pertinent negatives include no coughing or shortness of breath. Past treatments include saline sprays. The treatment provided mild relief. REVIEW OF SYSTEMS    Review of Systems   Constitutional: Negative for fever, malaise/fatigue and weight loss. HENT: Positive for congestion, sinus pain and sinus pressure. Eyes: Negative. Respiratory: Negative for cough and shortness of breath. Cardiovascular: Negative for chest pain, palpitations and leg swelling. Gastrointestinal: Negative for abdominal pain. Genitourinary: Negative for frequency and urgency. Musculoskeletal: Negative. Skin: Negative. Neurological: Positive for headaches (left frontal). Negative for dizziness and sensory change. Endo/Heme/Allergies: Negative. Psychiatric/Behavioral: Negative for depression. The patient is not nervous/anxious and does not have insomnia.         PAST MEDICAL HISTORY    Past Medical History:   Diagnosis Date    Acid reflux     Fracture of thumb     20's    Hypokalemia  Nasal bone fx-closed     as a child       FAMILY HISTORY    Family History   Problem Relation Age of Onset    Heart Disease Mother         CABGx 3 , [place]   [facility] Hypertension Mother    [facility] Arthritis Mother     Dementia Mother     Osteoporosis Mother     Cervical Cancer Maternal Grandmother     Cancer Paternal Grandfather         throat    Hypertension Brother     Heart Disease Brother     Asthma Brother        SOCIAL HISTORY    Social History     Social History    Marital status:      Spouse name: N/A    Number of children: N/A    Years of education: N/A     Social History Main Topics    Smoking status: Never Smoker    Smokeless tobacco: Never Used    Alcohol use Yes      Comment: occ    Drug use: No    Sexual activity: Yes     Birth control/ protection: Surgical      Comment: hyst     Other Topics Concern    None     Social History Narrative    None       SURGICAL HISTORY    Past Surgical History:   Procedure Laterality Date    BLADDER REMOVAL       cysts removed x 2    BREAST ENHANCEMENT SURGERY Bilateral      implants placed under muscle    CATARACT REMOVAL Bilateral 10/2017     SECTION  , 1989    x 2    COLONOSCOPY  2015    negative    DILATION AND CURETTAGE OF UTERUS  1981    miscarriages    DILATION AND CURETTAGE OF UTERUS      dsyplasia    HERNIA REPAIR  2010    HYSTERECTOMY, TOTAL ABDOMINAL  1998    RHINOPLASTY  1998    TONSILLECTOMY AND ADENOIDECTOMY      1 yo       CURRENT MEDICATIONS    Current Outpatient Prescriptions   Medication Sig Dispense Refill    melatonin 3 MG TABS tablet Take 3 mg by mouth daily      fluticasone (FLONASE) 50 MCG/ACT nasal spray 1 spray by Each Nare route daily 1 Bottle 3    NONFORMULARY Acid ease prn      Omega-3 Fatty Acids (OMEGA 3 PO) Take by mouth      COLLAGEN PO Take by mouth      metroNIDAZOLE (METROGEL) 0.75 % gel       Potassium 99 MG TABS Take by mouth      Ibuprofen (ADVIL PO) Take 600 mg by mouth 3 times daily       Magnesium Citrate 100 MG TABS Take by mouth      aspirin 81 MG tablet Take 81 mg by mouth daily      Multiple Vitamins-Minerals (THERAPEUTIC MULTIVITAMIN-MINERALS) tablet Take 1 tablet by mouth daily       No current facility-administered medications for this visit. ALLERGIES    Allergies   Allergen Reactions    Macrodantin [Nitrofurantoin] Hives    Sulfa Antibiotics Hives and Other (See Comments)     Itchy throat       PHYSICAL EXAM   Physical Exam   Constitutional: She is oriented to person, place, and time. She appears well-developed and well-nourished. HENT:   Head: Normocephalic. Nose: Mucosal edema, nasal deformity and septal deviation present. Right sinus exhibits no maxillary sinus tenderness and no frontal sinus tenderness. Left sinus exhibits maxillary sinus tenderness and frontal sinus tenderness. Mouth/Throat: Oropharynx is clear and moist. No oropharyngeal exudate, posterior oropharyngeal edema or posterior oropharyngeal erythema. Eyes: Pupils are equal, round, and reactive to light. Neck: Normal range of motion. Neck supple. No thyromegaly present. Cardiovascular: Normal rate, regular rhythm and normal heart sounds. No murmur heard. Pulmonary/Chest: Effort normal and breath sounds normal. She has no wheezes. She has no rales. Abdominal: Soft. There is no tenderness. There is no rebound and no guarding. Musculoskeletal: Normal range of motion. She exhibits no edema, tenderness or deformity. Lymphadenopathy:     She has no cervical adenopathy. Neurological: She is alert and oriented to person, place, and time. Skin: Skin is warm and dry. Psychiatric: She has a normal mood and affect. Her behavior is normal.   Vitals reviewed. Assessment/PLan  1. Elevated hemoglobin ([address]  Recheck labs in a few months, hydrate.   - CBC Auto Differential; Future  - Vitamin B12; Future    2. Vitamin D deficiency  Cont supplement. - Vitamin D 25 Hydroxy; Future    3. Closed fracture of nasal bone, sequela    - External Referral To Ent    4. Chronic rhinitis  Cont saline spray, add steroid. - fluticasone (FLONASE) 50 MCG/ACT nasal spray; 1 spray by Each Nare route daily  Dispense: 1 Bottle; Refill: 3  - External Referral To Ent      Everette Yoo received counseling on the following healthy behaviors: nutrition, exercise and medication adherence  Reviewed prior labs and health maintenance. Continue current medications, diet and exercise. Discussed use, benefit, and side effects of prescribed medications. Barriers to medication compliance addressed. Patient given educational materials - see patient instructions. All patient questions answered. Patient voiced understanding. Return in about 3 months (around 11/30/2018) for labs.         Electronically signed by Darrell Wang MD on 8/31/18 at 8:14 AM

## 2018-09-17 ENCOUNTER — HOSPITAL ENCOUNTER (OUTPATIENT)
Dept: MAMMOGRAPHY | Age: 58
Discharge: HOME | End: 2018-09-19
Payer: COMMERCIAL

## 2018-09-17 DIAGNOSIS — Z12.31 ENCOUNTER FOR SCREENING MAMMOGRAM FOR MALIGNANT NEOPLASM OF BREAST: ICD-10-CM

## 2018-09-17 PROCEDURE — 77063 BREAST TOMOSYNTHESIS BI: CPT

## 2019-02-07 ENCOUNTER — HOSPITAL ENCOUNTER (OUTPATIENT)
Age: 59
Setting detail: SPECIMEN
Discharge: HOME | End: 2019-02-07
Payer: COMMERCIAL

## 2019-02-07 ENCOUNTER — OFFICE VISIT (OUTPATIENT)
Dept: FAMILY MEDICINE CLINIC | Age: 59
End: 2019-02-07
Payer: COMMERCIAL

## 2019-02-07 VITALS
WEIGHT: 126 LBS | HEART RATE: 68 BPM | SYSTOLIC BLOOD PRESSURE: 118 MMHG | HEIGHT: 66 IN | BODY MASS INDEX: 20.25 KG/M2 | DIASTOLIC BLOOD PRESSURE: 72 MMHG

## 2019-02-07 DIAGNOSIS — R68.2 DRY MOUTH: ICD-10-CM

## 2019-02-07 DIAGNOSIS — R20.2 BILATERAL NUMBNESS AND TINGLING OF ARMS AND LEGS: Primary | ICD-10-CM

## 2019-02-07 DIAGNOSIS — H04.123 BILATERAL DRY EYES: ICD-10-CM

## 2019-02-07 DIAGNOSIS — M25.60 JOINT STIFFNESS: ICD-10-CM

## 2019-02-07 DIAGNOSIS — R20.0 BILATERAL NUMBNESS AND TINGLING OF ARMS AND LEGS: ICD-10-CM

## 2019-02-07 DIAGNOSIS — R25.2 MUSCLE CRAMPS: ICD-10-CM

## 2019-02-07 DIAGNOSIS — R53.82 CHRONIC FATIGUE: ICD-10-CM

## 2019-02-07 DIAGNOSIS — E87.6 HYPOKALEMIA: ICD-10-CM

## 2019-02-07 DIAGNOSIS — R20.2 BILATERAL NUMBNESS AND TINGLING OF ARMS AND LEGS: ICD-10-CM

## 2019-02-07 DIAGNOSIS — M25.50 ARTHRALGIA, UNSPECIFIED JOINT: ICD-10-CM

## 2019-02-07 DIAGNOSIS — M79.10 MYALGIA: ICD-10-CM

## 2019-02-07 DIAGNOSIS — R20.0 BILATERAL NUMBNESS AND TINGLING OF ARMS AND LEGS: Primary | ICD-10-CM

## 2019-02-07 LAB
ABSOLUTE EOS #: 0.08 K/UL (ref 0–0.44)
ABSOLUTE IMMATURE GRANULOCYTE: <0.03 K/UL (ref 0–0.3)
ABSOLUTE LYMPH #: 2.26 K/UL (ref 1.1–3.7)
ABSOLUTE MONO #: 0.34 K/UL (ref 0.1–1.2)
ALBUMIN SERPL-MCNC: 4.9 G/DL (ref 3.5–5.2)
ALBUMIN/GLOBULIN RATIO: 2.3 (ref 1–2.5)
ALP BLD-CCNC: 48 U/L (ref 35–104)
ALT SERPL-CCNC: 19 U/L (ref 5–33)
ANION GAP SERPL CALCULATED.3IONS-SCNC: 13 MMOL/L (ref 9–17)
AST SERPL-CCNC: 22 U/L
BASOPHILS # BLD: 1 % (ref 0–2)
BASOPHILS ABSOLUTE: 0.05 K/UL (ref 0–0.2)
BILIRUB SERPL-MCNC: 0.9 MG/DL (ref 0.3–1.2)
BUN BLDV-MCNC: 11 MG/DL (ref 8–23)
BUN/CREAT BLD: ABNORMAL (ref 9–20)
C-REACTIVE PROTEIN: <0.3 MG/L (ref 0–5)
CALCIUM SERPL-MCNC: 9.6 MG/DL (ref 8.6–10.4)
CHLORIDE BLD-SCNC: 103 MMOL/L (ref 98–107)
CO2: 25 MMOL/L (ref 20–31)
CREAT SERPL-MCNC: 0.73 MG/DL (ref 0.5–0.9)
DIFFERENTIAL TYPE: ABNORMAL
EOSINOPHILS RELATIVE PERCENT: 1 % (ref 1–4)
GFR AFRICAN AMERICAN: >60 ML/MIN
GFR NON-AFRICAN AMERICAN: >60 ML/MIN
GFR SERPL CREATININE-BSD FRML MDRD: ABNORMAL ML/MIN/{1.73_M2}
GFR SERPL CREATININE-BSD FRML MDRD: ABNORMAL ML/MIN/{1.73_M2}
GLUCOSE BLD-MCNC: 112 MG/DL (ref 70–99)
HCT VFR BLD CALC: 46 % (ref 36.3–47.1)
HEMOGLOBIN: 15.7 G/DL (ref 11.9–15.1)
IMMATURE GRANULOCYTES: 0 %
LYMPHOCYTES # BLD: 36 % (ref 24–43)
MAGNESIUM: 2.1 MG/DL (ref 1.6–2.6)
MCH RBC QN AUTO: 32.4 PG (ref 25.2–33.5)
MCHC RBC AUTO-ENTMCNC: 34.1 G/DL (ref 28.4–34.8)
MCV RBC AUTO: 95 FL (ref 82.6–102.9)
MONOCYTES # BLD: 5 % (ref 3–12)
NRBC AUTOMATED: 0 PER 100 WBC
PDW BLD-RTO: 12.6 % (ref 11.8–14.4)
PLATELET # BLD: 280 K/UL (ref 138–453)
PLATELET ESTIMATE: ABNORMAL
PMV BLD AUTO: 10.4 FL (ref 8.1–13.5)
POTASSIUM SERPL-SCNC: 4.4 MMOL/L (ref 3.7–5.3)
RBC # BLD: 4.84 M/UL (ref 3.95–5.11)
RBC # BLD: ABNORMAL 10*6/UL
RHEUMATOID FACTOR: 10.2 IU/ML
SEDIMENTATION RATE, ERYTHROCYTE: 2 MM (ref 0–20)
SEG NEUTROPHILS: 57 % (ref 36–65)
SEGMENTED NEUTROPHILS ABSOLUTE COUNT: 3.58 K/UL (ref 1.5–8.1)
SODIUM BLD-SCNC: 141 MMOL/L (ref 135–144)
TOTAL PROTEIN: 7 G/DL (ref 6.4–8.3)
VITAMIN B-12: 967 PG/ML (ref 232–1245)
VITAMIN D 25-HYDROXY: 58.7 NG/ML (ref 30–100)
WBC # BLD: 6.3 K/UL (ref 3.5–11.3)
WBC # BLD: ABNORMAL 10*3/UL

## 2019-02-07 PROCEDURE — 1036F TOBACCO NON-USER: CPT | Performed by: FAMILY MEDICINE

## 2019-02-07 PROCEDURE — G8420 CALC BMI NORM PARAMETERS: HCPCS | Performed by: FAMILY MEDICINE

## 2019-02-07 PROCEDURE — 99214 OFFICE O/P EST MOD 30 MIN: CPT | Performed by: FAMILY MEDICINE

## 2019-02-07 PROCEDURE — G8484 FLU IMMUNIZE NO ADMIN: HCPCS | Performed by: FAMILY MEDICINE

## 2019-02-07 PROCEDURE — 3017F COLORECTAL CA SCREEN DOC REV: CPT | Performed by: FAMILY MEDICINE

## 2019-02-07 PROCEDURE — 36415 COLL VENOUS BLD VENIPUNCTURE: CPT | Performed by: FAMILY MEDICINE

## 2019-02-07 PROCEDURE — G8427 DOCREV CUR MEDS BY ELIG CLIN: HCPCS | Performed by: FAMILY MEDICINE

## 2019-02-07 RX ORDER — PENICILLIN V POTASSIUM 500 MG/1
TABLET ORAL
COMMUNITY
Start: 2019-02-06 | End: 2019-08-12 | Stop reason: CLARIF

## 2019-02-07 ASSESSMENT — PATIENT HEALTH QUESTIONNAIRE - PHQ9
SUM OF ALL RESPONSES TO PHQ9 QUESTIONS 1 & 2: 0
2. FEELING DOWN, DEPRESSED OR HOPELESS: 0
SUM OF ALL RESPONSES TO PHQ QUESTIONS 1-9: 0
SUM OF ALL RESPONSES TO PHQ QUESTIONS 1-9: 0
1. LITTLE INTEREST OR PLEASURE IN DOING THINGS: 0

## 2019-02-07 ASSESSMENT — ENCOUNTER SYMPTOMS
CONSTIPATION: 0
ALLERGIC/IMMUNOLOGIC NEGATIVE: 1
BOWEL INCONTINENCE: 0
DIARRHEA: 0
ABDOMINAL PAIN: 0
COUGH: 0
SHORTNESS OF BREATH: 0
BACK PAIN: 1
EYES NEGATIVE: 1

## 2019-02-08 LAB — ANTI-NUCLEAR ANTIBODY (ANA): NEGATIVE

## 2019-02-12 ENCOUNTER — TELEPHONE (OUTPATIENT)
Dept: FAMILY MEDICINE CLINIC | Age: 59
End: 2019-02-12

## 2019-03-05 ENCOUNTER — TELEPHONE (OUTPATIENT)
Dept: FAMILY MEDICINE CLINIC | Age: 59
End: 2019-03-05

## 2019-03-13 ENCOUNTER — TELEPHONE (OUTPATIENT)
Dept: FAMILY MEDICINE CLINIC | Age: 59
End: 2019-03-13

## 2019-03-13 DIAGNOSIS — I49.3 PVC (PREMATURE VENTRICULAR CONTRACTION): Primary | ICD-10-CM

## 2019-05-07 ENCOUNTER — OFFICE VISIT (OUTPATIENT)
Dept: FAMILY MEDICINE CLINIC | Age: 59
End: 2019-05-07
Payer: COMMERCIAL

## 2019-05-07 VITALS
HEART RATE: 64 BPM | DIASTOLIC BLOOD PRESSURE: 80 MMHG | SYSTOLIC BLOOD PRESSURE: 130 MMHG | WEIGHT: 124 LBS | BODY MASS INDEX: 20.01 KG/M2

## 2019-05-07 DIAGNOSIS — G47.01 INSOMNIA DUE TO MEDICAL CONDITION: ICD-10-CM

## 2019-05-07 DIAGNOSIS — R53.82 CHRONIC FATIGUE: ICD-10-CM

## 2019-05-07 DIAGNOSIS — M25.50 ARTHRALGIA, UNSPECIFIED JOINT: ICD-10-CM

## 2019-05-07 DIAGNOSIS — F41.9 ANXIETY: ICD-10-CM

## 2019-05-07 DIAGNOSIS — I49.3 PVC (PREMATURE VENTRICULAR CONTRACTION): ICD-10-CM

## 2019-05-07 DIAGNOSIS — T85.9XXA DISORDER OF BREAST IMPLANT, INITIAL ENCOUNTER: Primary | ICD-10-CM

## 2019-05-07 PROCEDURE — G8420 CALC BMI NORM PARAMETERS: HCPCS | Performed by: FAMILY MEDICINE

## 2019-05-07 PROCEDURE — G8427 DOCREV CUR MEDS BY ELIG CLIN: HCPCS | Performed by: FAMILY MEDICINE

## 2019-05-07 PROCEDURE — 1036F TOBACCO NON-USER: CPT | Performed by: FAMILY MEDICINE

## 2019-05-07 PROCEDURE — 3017F COLORECTAL CA SCREEN DOC REV: CPT | Performed by: FAMILY MEDICINE

## 2019-05-07 PROCEDURE — 99214 OFFICE O/P EST MOD 30 MIN: CPT | Performed by: FAMILY MEDICINE

## 2019-05-07 RX ORDER — CEPHALEXIN 500 MG/1
500 CAPSULE ORAL 3 TIMES DAILY
Status: ON HOLD | COMMUNITY
Start: 2019-08-08 | End: 2019-08-16 | Stop reason: HOSPADM

## 2019-05-07 RX ORDER — VALACYCLOVIR HYDROCHLORIDE 1 G/1
1000 TABLET, FILM COATED ORAL PRN
COMMUNITY
Start: 2019-05-06 | End: 2019-09-17 | Stop reason: SDUPTHER

## 2019-05-07 ASSESSMENT — ENCOUNTER SYMPTOMS
SHORTNESS OF BREATH: 0
EYES NEGATIVE: 1
DIARRHEA: 1
BLOOD IN STOOL: 0
COUGH: 0
ABDOMINAL PAIN: 0

## 2019-05-07 NOTE — PROGRESS NOTES
[facility] PHYSICIANS  MADINA BROTHERS [facility] PLACE [facility]  [address] [facility] [address] [facility] [address]  Dept: [phone]    5/7/2019    CHIEF COMPLAINT    Chief Complaint   Patient presents with    Anxiety     f/u    Pain     both sides ribs, lt shoulder, lt arm pit       HPI    Michelle Veloz is a 59 y.o. female who presents   Chief Complaint   Patient presents with    Anxiety     f/u    Pain     both sides ribs, lt shoulder, lt arm pit   . Concerned that she may have breast implant illness. Having sx of brain fog, dry eyes and mouth, anxiety, thinking she is dying, pain in various joints, back, axilla, chest wall, hips, palpitations, weight loss, fatigue, chronic sinus sx. Feels like she has flaring of sx during winter. This year has sx have been worse. Has cohesive gel implants placed 10 years ago. She going to seeing Dr. Phill Martin, plastic surgeon specializing in removing implants. Planning to schedule surgery. Needs an ekg, maybe labs. Had a mammogram within the past year. Needs a referral to surgeon. Has been to cardiologist who started her on BB as needed for frequent pvc's which she hasn't taken. Takes xanax occasionally for anxiety but doesn't want to take it daily. Vitals:    05/07/19 0942   BP: 130/80   Pulse: 64   Weight: 124 lb (56.2 kg)       REVIEW OF SYSTEMS    Review of Systems   Constitutional: Positive for fatigue. Negative for fever. HENT: Positive for congestion. Dry mouth   Eyes: Negative. Dry eyes   Respiratory: Negative for cough and shortness of breath. Cardiovascular: Positive for palpitations (frequent pvc's on heart monitor. ). Negative for chest pain and leg swelling. Gastrointestinal: Positive for diarrhea (mild). Negative for abdominal pain and blood in stool. GERD   Endocrine: Positive for polydipsia and polyuria. Genitourinary: Positive for frequency. Negative for urgency.    Musculoskeletal: Positive for arthralgias (various joints, varies day to day). Skin: Negative. Neurological: Negative for dizziness and headaches. Psychiatric/Behavioral: Positive for dysphoric mood (mild) and sleep disturbance. The patient is nervous/anxious (has taken xanax in the past but has concern about chronic use. ).         PAST MEDICAL HISTORY    Past Medical History:   Diagnosis Date    Acid reflux     Fracture of thumb     19's    Hypokalemia     Nasal bone fx-closed     as a child       FAMILY HISTORY    Family History   Problem Relation Age of Onset    Heart Disease Mother         CABGx 3 , MI   Willis Hypertension Mother    Willis Arthritis Mother     Dementia Mother     Osteoporosis Mother     Cancer Mother [de-identified]        vulvar     Cervical Cancer Maternal Grandmother     Cancer Paternal Grandfather         throat    Hypertension Brother     Heart Disease Brother     Coronary Art Dis Brother     Asthma Brother     Heart Failure Father        SOCIAL HISTORY    Social History     Socioeconomic History    Marital status:      Spouse name: None    Number of children: None    Years of education: None    Highest education level: None   Occupational History    None   Social Needs    Financial resource strain: None    Food insecurity:     Worry: None     Inability: None    Transportation needs:     Medical: None     Non-medical: None   Tobacco Use    Smoking status: Never Smoker    Smokeless tobacco: Never Used   Substance and Sexual Activity    Alcohol use: Yes     Comment: occ    Drug use: No    Sexual activity: Yes     Birth control/protection: Surgical     Comment: hyst   Lifestyle    Physical activity:     Days per week: None     Minutes per session: None    Stress: None   Relationships    Social connections:     Talks on phone: None     Gets together: None     Attends Scientologist service: None     Active member of club or organization: None     Attends meetings of clubs or organizations: None time. She appears well-developed and well-nourished. HENT:   Head: Normocephalic. Mouth/Throat: Oropharynx is clear and moist.   Eyes: Pupils are equal, round, and reactive to light. Conjunctivae and EOM are normal.   Neck: Normal range of motion. Neck supple. No thyromegaly present. Cardiovascular: Normal rate, regular rhythm and normal heart sounds. No murmur heard. Pulmonary/Chest: Effort normal and breath sounds normal. She has no wheezes. She has no rales. Abdominal: Soft. There is no tenderness. There is no rebound and no guarding. Musculoskeletal: Normal range of motion. She exhibits tenderness (along thoracic and lumbar spine). She exhibits no edema or deformity. Lymphadenopathy:     She has no cervical adenopathy. Neurological: She is alert and oriented to person, place, and time. Skin: Skin is warm and dry. Psychiatric: She has a normal mood and affect. Her behavior is normal.   Vitals reviewed. Assessment/PLan  1. Disorder of breast implant, initial encounter  Referral for removal of implants per pt request.   - External Referral To Plastic Surgery-Dr. Laura Smith in [facility]. 2. PVC (premature ventricular contraction)  Take bb if needed. Cont to treat anxiety prn.     3. Arthralgia, unspecified joint  Stay active, prn analgesic. 4. Chronic fatigue  Chronic. Reviewed prior labs. 5. Anxiety  Cont prn xanax. 6. Insomnia due to medical condition  Cont melatonin, xanax prn. Keven Peraza received counseling on the following healthy behaviors: nutrition, exercise and medication adherence  Reviewed prior labs and health maintenance. Continue current medications, diet and exercise. Discussed use, benefit, and side effects of prescribed medications. Barriers to medication compliance addressed. Patient given educational materials - see patient instructions. All patient questions answered. Patient voiced understanding.       Return if symptoms worsen or fail to improve.         Electronically signed by Lashonda Shen MD on 5/7/19 at 9:46 AM

## 2019-05-07 NOTE — PROGRESS NOTES
Visit Information    Have you changed or started any medications since your last visit including any over-the-counter medicines, vitamins, or herbal medicines? yes -    Have you stopped taking any of your medications? Is so, why? -  no  Are you having any side effects from any of your medications? - no    Have you seen any other physician or provider since your last visit? yes -    Have you had any other diagnostic tests since your last visit? yes -    Have you been seen in the emergency room and/or had an admission in a hospital since we last saw you?  no   Have you had your routine dental cleaning in the past 6 months?  yes -      Do you have an active MyChart account? If no, what is the barrier?   Yes    Patient Care Team:  Lindy Ireland MD as PCP - General    Medical History Review  Past Medical, Family, and Social History reviewed and does contribute to the patient presenting condition    Health Maintenance   Topic Date Due    HIV screen  06/13/1973    Shingles Vaccine (1 of 2) 06/13/2008    Flu vaccine (Season Ended) 09/01/2019    Breast cancer screen  09/17/2020    DTaP/Tdap/Td vaccine (2 - Td) 01/01/2021    Lipid screen  08/17/2023    Colon cancer screen colonoscopy  05/23/2026    Hepatitis C screen  Completed    Pneumococcal 0-64 years Vaccine  Aged Out

## 2019-08-12 ENCOUNTER — OFFICE VISIT (OUTPATIENT)
Dept: FAMILY MEDICINE CLINIC | Age: 59
End: 2019-08-12
Payer: COMMERCIAL

## 2019-08-12 ENCOUNTER — HOSPITAL ENCOUNTER (OUTPATIENT)
Age: 59
Setting detail: SPECIMEN
Discharge: HOME | End: 2019-08-12
Payer: COMMERCIAL

## 2019-08-12 VITALS
TEMPERATURE: 98.8 F | DIASTOLIC BLOOD PRESSURE: 84 MMHG | SYSTOLIC BLOOD PRESSURE: 112 MMHG | WEIGHT: 124 LBS | BODY MASS INDEX: 20.01 KG/M2 | HEART RATE: 74 BPM

## 2019-08-12 DIAGNOSIS — L03.818 CELLULITIS OF OTHER SPECIFIED SITE: Primary | ICD-10-CM

## 2019-08-12 DIAGNOSIS — L03.818 CELLULITIS OF OTHER SPECIFIED SITE: ICD-10-CM

## 2019-08-12 LAB
ABSOLUTE EOS #: <0.03 K/UL (ref 0–0.44)
ABSOLUTE IMMATURE GRANULOCYTE: 0.09 K/UL (ref 0–0.3)
ABSOLUTE LYMPH #: 1.86 K/UL (ref 1.1–3.7)
ABSOLUTE MONO #: 0.9 K/UL (ref 0.1–1.2)
BASOPHILS # BLD: 1 % (ref 0–2)
BASOPHILS ABSOLUTE: 0.07 K/UL (ref 0–0.2)
DIFFERENTIAL TYPE: ABNORMAL
EOSINOPHILS RELATIVE PERCENT: 0 % (ref 1–4)
HCT VFR BLD CALC: 40.8 % (ref 36.3–47.1)
HEMOGLOBIN: 13.1 G/DL (ref 11.9–15.1)
IMMATURE GRANULOCYTES: 1 %
LYMPHOCYTES # BLD: 15 % (ref 24–43)
MCH RBC QN AUTO: 31.1 PG (ref 25.2–33.5)
MCHC RBC AUTO-ENTMCNC: 32.1 G/DL (ref 28.4–34.8)
MCV RBC AUTO: 96.9 FL (ref 82.6–102.9)
MONOCYTES # BLD: 7 % (ref 3–12)
NRBC AUTOMATED: 0 PER 100 WBC
PDW BLD-RTO: 12.4 % (ref 11.8–14.4)
PLATELET # BLD: 452 K/UL (ref 138–453)
PLATELET ESTIMATE: ABNORMAL
PMV BLD AUTO: 9.7 FL (ref 8.1–13.5)
RBC # BLD: 4.21 M/UL (ref 3.95–5.11)
RBC # BLD: ABNORMAL 10*6/UL
SEG NEUTROPHILS: 76 % (ref 36–65)
SEGMENTED NEUTROPHILS ABSOLUTE COUNT: 9.48 K/UL (ref 1.5–8.1)
WBC # BLD: 12.4 K/UL (ref 3.5–11.3)
WBC # BLD: ABNORMAL 10*3/UL

## 2019-08-12 PROCEDURE — 36415 COLL VENOUS BLD VENIPUNCTURE: CPT | Performed by: FAMILY MEDICINE

## 2019-08-12 PROCEDURE — G8427 DOCREV CUR MEDS BY ELIG CLIN: HCPCS | Performed by: FAMILY MEDICINE

## 2019-08-12 PROCEDURE — 1036F TOBACCO NON-USER: CPT | Performed by: FAMILY MEDICINE

## 2019-08-12 PROCEDURE — G8420 CALC BMI NORM PARAMETERS: HCPCS | Performed by: FAMILY MEDICINE

## 2019-08-12 PROCEDURE — 3017F COLORECTAL CA SCREEN DOC REV: CPT | Performed by: FAMILY MEDICINE

## 2019-08-12 PROCEDURE — 99213 OFFICE O/P EST LOW 20 MIN: CPT | Performed by: FAMILY MEDICINE

## 2019-08-12 RX ORDER — CHOLECALCIFEROL (VITAMIN D3) 50 MCG
2000 TABLET ORAL DAILY
COMMUNITY

## 2019-08-12 RX ORDER — DOXYCYCLINE HYCLATE 100 MG
100 TABLET ORAL 2 TIMES DAILY
Qty: 14 TABLET | Refills: 0 | Status: ON HOLD | OUTPATIENT
Start: 2019-08-12 | End: 2019-08-16 | Stop reason: HOSPADM

## 2019-08-12 ASSESSMENT — ENCOUNTER SYMPTOMS
SHORTNESS OF BREATH: 0
CONSTIPATION: 0
ABDOMINAL PAIN: 0
ROS SKIN COMMENTS: SEE HPI

## 2019-08-12 NOTE — PROGRESS NOTES
7 days  Dispense: 14 tablet; Refill: 0  - CBC Auto Differential; Future      Alexei Luke received counseling on the following healthy behaviors: medication adherence  Reviewed prior labs and health maintenance. Continue current medications, diet and exercise. Discussed use, benefit, and side effects of prescribed medications. Barriers to medication compliance addressed. Patient given educational materials - see patient instructions. All patient questions answered. Patient voiced understanding. Return if symptoms worsen or fail to improve.         Electronically signed by Silas Feliciano MD on 8/12/19 at 11:00 AM

## 2019-08-13 ENCOUNTER — HOSPITAL ENCOUNTER (INPATIENT)
Age: 59
LOS: 2 days | Discharge: HOME | DRG: 858 | End: 2019-08-16
Attending: EMERGENCY MEDICINE | Admitting: INTERNAL MEDICINE
Payer: COMMERCIAL

## 2019-08-13 DIAGNOSIS — N61.1 BREAST ABSCESS OF FEMALE: Primary | ICD-10-CM

## 2019-08-13 LAB
ABSOLUTE EOS #: 0.07 K/UL (ref 0–0.44)
ABSOLUTE IMMATURE GRANULOCYTE: 0.12 K/UL (ref 0–0.3)
ABSOLUTE LYMPH #: 2.37 K/UL (ref 1.1–3.7)
ABSOLUTE MONO #: 0.89 K/UL (ref 0.1–1.2)
BASOPHILS # BLD: 1 % (ref 0–2)
BASOPHILS ABSOLUTE: 0.06 K/UL (ref 0–0.2)
DIFFERENTIAL TYPE: ABNORMAL
EOSINOPHILS RELATIVE PERCENT: 1 % (ref 1–4)
HCT VFR BLD CALC: 35.7 % (ref 36.3–47.1)
HEMOGLOBIN: 12 G/DL (ref 11.9–15.1)
IMMATURE GRANULOCYTES: 1 %
LYMPHOCYTES # BLD: 24 % (ref 24–43)
MCH RBC QN AUTO: 31.3 PG (ref 25.2–33.5)
MCHC RBC AUTO-ENTMCNC: 33.6 G/DL (ref 28.4–34.8)
MCV RBC AUTO: 93.2 FL (ref 82.6–102.9)
MONOCYTES # BLD: 9 % (ref 3–12)
NRBC AUTOMATED: 0 PER 100 WBC
PDW BLD-RTO: 12.2 % (ref 11.8–14.4)
PLATELET # BLD: 425 K/UL (ref 138–453)
PLATELET ESTIMATE: ABNORMAL
PMV BLD AUTO: 9.2 FL (ref 8.1–13.5)
RBC # BLD: 3.83 M/UL (ref 3.95–5.11)
RBC # BLD: ABNORMAL 10*6/UL
SEG NEUTROPHILS: 64 % (ref 36–65)
SEGMENTED NEUTROPHILS ABSOLUTE COUNT: 6.27 K/UL (ref 1.5–8.1)
WBC # BLD: 9.8 K/UL (ref 3.5–11.3)
WBC # BLD: ABNORMAL 10*3/UL

## 2019-08-13 PROCEDURE — 83605 ASSAY OF LACTIC ACID: CPT

## 2019-08-13 PROCEDURE — 87186 SC STD MICRODIL/AGAR DIL: CPT

## 2019-08-13 PROCEDURE — 36415 COLL VENOUS BLD VENIPUNCTURE: CPT

## 2019-08-13 PROCEDURE — 87077 CULTURE AEROBIC IDENTIFY: CPT

## 2019-08-13 PROCEDURE — 80048 BASIC METABOLIC PNL TOTAL CA: CPT

## 2019-08-13 PROCEDURE — 96365 THER/PROPH/DIAG IV INF INIT: CPT

## 2019-08-13 PROCEDURE — 85025 COMPLETE CBC W/AUTO DIFF WBC: CPT

## 2019-08-13 PROCEDURE — 99284 EMERGENCY DEPT VISIT MOD MDM: CPT

## 2019-08-13 PROCEDURE — 87040 BLOOD CULTURE FOR BACTERIA: CPT

## 2019-08-13 PROCEDURE — 87070 CULTURE OTHR SPECIMN AEROBIC: CPT

## 2019-08-13 PROCEDURE — 6360000002 HC RX W HCPCS: Performed by: PHYSICIAN ASSISTANT

## 2019-08-13 PROCEDURE — 2580000003 HC RX 258: Performed by: PHYSICIAN ASSISTANT

## 2019-08-13 PROCEDURE — 87205 SMEAR GRAM STAIN: CPT

## 2019-08-13 RX ORDER — SODIUM CHLORIDE 0.9 % (FLUSH) 0.9 %
10 SYRINGE (ML) INJECTION EVERY 12 HOURS SCHEDULED
Status: DISCONTINUED | OUTPATIENT
Start: 2019-08-13 | End: 2019-08-14 | Stop reason: SDUPTHER

## 2019-08-13 RX ORDER — SODIUM CHLORIDE 0.9 % (FLUSH) 0.9 %
10 SYRINGE (ML) INJECTION PRN
Status: DISCONTINUED | OUTPATIENT
Start: 2019-08-13 | End: 2019-08-14 | Stop reason: SDUPTHER

## 2019-08-13 RX ADMIN — VANCOMYCIN HYDROCHLORIDE 1500 MG: 5 INJECTION, POWDER, LYOPHILIZED, FOR SOLUTION INTRAVENOUS at 23:30

## 2019-08-13 ASSESSMENT — PAIN DESCRIPTION - LOCATION: LOCATION: BREAST

## 2019-08-13 ASSESSMENT — PAIN DESCRIPTION - ORIENTATION: ORIENTATION: LEFT

## 2019-08-13 ASSESSMENT — PAIN DESCRIPTION - PAIN TYPE: TYPE: ACUTE PAIN

## 2019-08-13 ASSESSMENT — PAIN SCALES - GENERAL: PAINLEVEL_OUTOF10: 2

## 2019-08-14 PROBLEM — K21.9 GASTROESOPHAGEAL REFLUX DISEASE WITHOUT ESOPHAGITIS: Status: ACTIVE | Noted: 2019-08-14

## 2019-08-14 PROBLEM — N61.0 CELLULITIS OF LEFT BREAST: Status: ACTIVE | Noted: 2019-08-14

## 2019-08-14 LAB
ANION GAP SERPL CALCULATED.3IONS-SCNC: 13 MMOL/L (ref 9–17)
BUN BLDV-MCNC: 14 MG/DL (ref 8–23)
BUN/CREAT BLD: 18 (ref 9–20)
CALCIUM SERPL-MCNC: 9.4 MG/DL (ref 8.6–10.4)
CHLORIDE BLD-SCNC: 99 MMOL/L (ref 98–107)
CO2: 26 MMOL/L (ref 20–31)
CREAT SERPL-MCNC: 0.76 MG/DL (ref 0.5–0.9)
GFR AFRICAN AMERICAN: >60 ML/MIN
GFR NON-AFRICAN AMERICAN: >60 ML/MIN
GFR SERPL CREATININE-BSD FRML MDRD: ABNORMAL ML/MIN/{1.73_M2}
GFR SERPL CREATININE-BSD FRML MDRD: ABNORMAL ML/MIN/{1.73_M2}
GLUCOSE BLD-MCNC: 120 MG/DL (ref 70–99)
LACTIC ACID, SEPSIS WHOLE BLOOD: NORMAL MMOL/L (ref 0.5–1.9)
LACTIC ACID, SEPSIS WHOLE BLOOD: NORMAL MMOL/L (ref 0.5–1.9)
LACTIC ACID, SEPSIS: 0.7 MMOL/L (ref 0.5–1.9)
LACTIC ACID, SEPSIS: 0.7 MMOL/L (ref 0.5–1.9)
POTASSIUM SERPL-SCNC: 3.9 MMOL/L (ref 3.7–5.3)
SODIUM BLD-SCNC: 138 MMOL/L (ref 135–144)

## 2019-08-14 PROCEDURE — 99222 1ST HOSP IP/OBS MODERATE 55: CPT | Performed by: NURSE PRACTITIONER

## 2019-08-14 PROCEDURE — 6360000002 HC RX W HCPCS: Performed by: PHYSICIAN ASSISTANT

## 2019-08-14 PROCEDURE — 2580000003 HC RX 258: Performed by: NURSE PRACTITIONER

## 2019-08-14 PROCEDURE — 36415 COLL VENOUS BLD VENIPUNCTURE: CPT

## 2019-08-14 PROCEDURE — 6360000002 HC RX W HCPCS: Performed by: NURSE PRACTITIONER

## 2019-08-14 PROCEDURE — 6360000002 HC RX W HCPCS: Performed by: INTERNAL MEDICINE

## 2019-08-14 PROCEDURE — 83605 ASSAY OF LACTIC ACID: CPT

## 2019-08-14 PROCEDURE — 2580000003 HC RX 258: Performed by: INTERNAL MEDICINE

## 2019-08-14 PROCEDURE — 96366 THER/PROPH/DIAG IV INF ADDON: CPT

## 2019-08-14 PROCEDURE — 1200000000 HC SEMI PRIVATE

## 2019-08-14 PROCEDURE — 6370000000 HC RX 637 (ALT 250 FOR IP): Performed by: NURSE PRACTITIONER

## 2019-08-14 RX ORDER — ONDANSETRON 2 MG/ML
4 INJECTION INTRAMUSCULAR; INTRAVENOUS EVERY 6 HOURS PRN
Status: DISCONTINUED | OUTPATIENT
Start: 2019-08-14 | End: 2019-08-16 | Stop reason: HOSPADM

## 2019-08-14 RX ORDER — POTASSIUM CHLORIDE 20 MEQ/1
40 TABLET, EXTENDED RELEASE ORAL PRN
Status: DISCONTINUED | OUTPATIENT
Start: 2019-08-14 | End: 2019-08-16 | Stop reason: HOSPADM

## 2019-08-14 RX ORDER — OXYCODONE HYDROCHLORIDE AND ACETAMINOPHEN 5; 325 MG/1; MG/1
2 TABLET ORAL EVERY 4 HOURS PRN
Status: DISCONTINUED | OUTPATIENT
Start: 2019-08-14 | End: 2019-08-16 | Stop reason: HOSPADM

## 2019-08-14 RX ORDER — DIPHENHYDRAMINE HYDROCHLORIDE 50 MG/ML
12.5 INJECTION INTRAMUSCULAR; INTRAVENOUS ONCE
Status: COMPLETED | OUTPATIENT
Start: 2019-08-14 | End: 2019-08-14

## 2019-08-14 RX ORDER — SODIUM CHLORIDE 0.9 % (FLUSH) 0.9 %
10 SYRINGE (ML) INJECTION EVERY 12 HOURS SCHEDULED
Status: DISCONTINUED | OUTPATIENT
Start: 2019-08-14 | End: 2019-08-16 | Stop reason: HOSPADM

## 2019-08-14 RX ORDER — SODIUM CHLORIDE 9 MG/ML
INJECTION, SOLUTION INTRAVENOUS CONTINUOUS
Status: DISCONTINUED | OUTPATIENT
Start: 2019-08-14 | End: 2019-08-16

## 2019-08-14 RX ORDER — ACETAMINOPHEN 325 MG/1
650 TABLET ORAL EVERY 4 HOURS PRN
Status: DISCONTINUED | OUTPATIENT
Start: 2019-08-14 | End: 2019-08-16 | Stop reason: HOSPADM

## 2019-08-14 RX ORDER — OXYCODONE HYDROCHLORIDE AND ACETAMINOPHEN 5; 325 MG/1; MG/1
1 TABLET ORAL EVERY 4 HOURS PRN
Status: DISCONTINUED | OUTPATIENT
Start: 2019-08-14 | End: 2019-08-16 | Stop reason: HOSPADM

## 2019-08-14 RX ORDER — SODIUM CHLORIDE 0.9 % (FLUSH) 0.9 %
10 SYRINGE (ML) INJECTION PRN
Status: DISCONTINUED | OUTPATIENT
Start: 2019-08-14 | End: 2019-08-16 | Stop reason: HOSPADM

## 2019-08-14 RX ORDER — ONDANSETRON 2 MG/ML
4 INJECTION INTRAMUSCULAR; INTRAVENOUS EVERY 6 HOURS PRN
Status: DISCONTINUED | OUTPATIENT
Start: 2019-08-14 | End: 2019-08-14 | Stop reason: SDUPTHER

## 2019-08-14 RX ORDER — LANOLIN ALCOHOL/MO/W.PET/CERES
3 CREAM (GRAM) TOPICAL DAILY
Status: DISCONTINUED | OUTPATIENT
Start: 2019-08-14 | End: 2019-08-14

## 2019-08-14 RX ORDER — NICOTINE 21 MG/24HR
1 PATCH, TRANSDERMAL 24 HOURS TRANSDERMAL DAILY PRN
Status: DISCONTINUED | OUTPATIENT
Start: 2019-08-14 | End: 2019-08-16 | Stop reason: HOSPADM

## 2019-08-14 RX ORDER — ONDANSETRON 4 MG/1
4 TABLET, ORALLY DISINTEGRATING ORAL EVERY 6 HOURS PRN
Status: DISCONTINUED | OUTPATIENT
Start: 2019-08-14 | End: 2019-08-16 | Stop reason: HOSPADM

## 2019-08-14 RX ORDER — POTASSIUM CHLORIDE 7.45 MG/ML
10 INJECTION INTRAVENOUS PRN
Status: DISCONTINUED | OUTPATIENT
Start: 2019-08-14 | End: 2019-08-16 | Stop reason: HOSPADM

## 2019-08-14 RX ORDER — MAGNESIUM SULFATE 1 G/100ML
1 INJECTION INTRAVENOUS PRN
Status: DISCONTINUED | OUTPATIENT
Start: 2019-08-14 | End: 2019-08-16 | Stop reason: HOSPADM

## 2019-08-14 RX ORDER — OMEGA-3/DHA/EPA/FISH OIL 60 MG-90MG
500 CAPSULE ORAL 2 TIMES DAILY
COMMUNITY

## 2019-08-14 RX ADMIN — VANCOMYCIN HYDROCHLORIDE 1000 MG: 1 INJECTION, POWDER, LYOPHILIZED, FOR SOLUTION INTRAVENOUS at 11:43

## 2019-08-14 RX ADMIN — VANCOMYCIN HYDROCHLORIDE 1000 MG: 1 INJECTION, POWDER, LYOPHILIZED, FOR SOLUTION INTRAVENOUS at 23:12

## 2019-08-14 RX ADMIN — VITAMIN D, TAB 1000IU (100/BT) 1000 UNITS: 25 TAB at 08:19

## 2019-08-14 RX ADMIN — DIPHENHYDRAMINE HYDROCHLORIDE 12.5 MG: 50 INJECTION, SOLUTION INTRAMUSCULAR; INTRAVENOUS at 02:23

## 2019-08-14 RX ADMIN — SODIUM CHLORIDE: 9 INJECTION, SOLUTION INTRAVENOUS at 02:57

## 2019-08-14 RX ADMIN — ACETAMINOPHEN 650 MG: 325 TABLET ORAL at 08:19

## 2019-08-14 RX ADMIN — ACETAMINOPHEN 650 MG: 325 TABLET ORAL at 15:59

## 2019-08-14 ASSESSMENT — PAIN DESCRIPTION - ORIENTATION
ORIENTATION: LEFT

## 2019-08-14 ASSESSMENT — PAIN SCALES - GENERAL
PAINLEVEL_OUTOF10: 5
PAINLEVEL_OUTOF10: 2
PAINLEVEL_OUTOF10: 0
PAINLEVEL_OUTOF10: 2
PAINLEVEL_OUTOF10: 2
PAINLEVEL_OUTOF10: 4
PAINLEVEL_OUTOF10: 2

## 2019-08-14 ASSESSMENT — PAIN DESCRIPTION - LOCATION
LOCATION: BREAST

## 2019-08-14 ASSESSMENT — PAIN DESCRIPTION - ONSET
ONSET: ON-GOING

## 2019-08-14 ASSESSMENT — PAIN DESCRIPTION - PROGRESSION
CLINICAL_PROGRESSION: NOT CHANGED
CLINICAL_PROGRESSION: NOT CHANGED

## 2019-08-14 ASSESSMENT — PAIN DESCRIPTION - PAIN TYPE
TYPE: ACUTE PAIN

## 2019-08-14 ASSESSMENT — PAIN DESCRIPTION - FREQUENCY
FREQUENCY: CONTINUOUS
FREQUENCY: CONTINUOUS

## 2019-08-14 ASSESSMENT — PAIN DESCRIPTION - DESCRIPTORS
DESCRIPTORS: DISCOMFORT
DESCRIPTORS: ACHING;DISCOMFORT
DESCRIPTORS: ACHING;DISCOMFORT
DESCRIPTORS: DISCOMFORT
DESCRIPTORS: DISCOMFORT
DESCRIPTORS: ACHING;DISCOMFORT
DESCRIPTORS: ACHING;DISCOMFORT

## 2019-08-14 NOTE — PROGRESS NOTES
Matt Bello on 8/14/2019 at 12:34 PM       Prior to Admission medications    Medication Sig Start Date End Date Taking?  Authorizing Provider   Omega-3 Fatty Acids (FISH OIL) 500 MG CAPS Take 500 mg by mouth 2 times daily   Yes Historical Provider, MD   sodium chloride (OCEAN, BABY AYR) 0.65 % nasal spray 1 spray by Nasal route 2 times daily   Yes Historical Provider, MD   Cholecalciferol (VITAMIN D) 2000 units TABS tablet Take 2,000 Units by mouth daily    Yes Historical Provider, MD   Probiotic Product (PROBIOTIC-10 PO) Take 1 tablet by mouth daily    Yes Historical Provider, MD   doxycycline hyclate (VIBRA-TABS) 100 MG tablet Take 1 tablet by mouth 2 times daily for 7 days 8/12/19 8/19/19 Yes Nadine Wise MD   cephALEXin (KEFLEX) 500 MG capsule 500 mg 3 times daily  8/8/19 8/15/19 Yes Historical Provider, MD   valACYclovir (VALTREX) 1 g tablet Take 1,000 mg by mouth as needed (1 tablet po Q12H prn for cold sores)  5/6/19  Yes Historical Provider, MD   metroNIDAZOLE (METROGEL) 0.75 % gel Apply topically 2 times daily Indications: apply to face for rosacea  5/22/17  Yes Historical Provider, MD   Potassium 99 MG TABS Take 1 tablet by mouth 2 times daily    Yes Historical Provider, MD   metoprolol tartrate (LOPRESSOR) 25 MG tablet 25 mg as needed (for PVCs)  3/26/19   Historical Provider, MD   NONFORMULARY Acid ease prn    Historical Provider, MD

## 2019-08-14 NOTE — PROGRESS NOTES
Pt admitted to room 2020 from ER at 0234. Pt alert and oriented x4  Oriented to room and call light/tv controls. Bed in lowest position, wheels locked, 2/4 side rails up  Call light in reach, room free of clutter, adequate lighting provided. Patient denies pain at this time.

## 2019-08-14 NOTE — ED NOTES
Pt presents to the er c/o cellulitis in her left breast the patient states that she has had this for one week she has been on Keflex at home and started on doxycycline yesterday pts left breast has redness covering the entire bottom portion of her breast and into the axillary area pt has a thick tan discharge oozing out of the left lateral side of her breast       Makeda Stock RN  08/13/19 6838

## 2019-08-14 NOTE — CARE COORDINATION
Case Management Initial Discharge Plan  [facility],             Met with:patient to discuss discharge plans. Information verified: address, contacts, phone number, , insurance Yes  PCP: Wilfredo Kasper MD  Date of last visit: Monday she last saw Dr. Melissa Vilchis Provider: [facility]:  Spouse/Significant Other   Support Systems:  Spouse/Significant Other, Children    Home has 2 stories  3 stairs to climb to get into front door,  12 stairs to climb to reach second floor  Location of bedroom/bathroom in home upstairs bed and bath    Patient able to perform ADL's:Independent    Current Services (outpatient & in home) none   DME equipment:  None   DME provider:  none    Pharmacy: [facility] on [address] in [facility] near [place] [phone] per pt   Potential Assistance Purchasing Medications:  No  Does patient want to participate in local refill/ meds to beds program?  Yes    Potential Assistance Needed:  N/A    Patient agreeable to home care: No  [place] of choice provided:  yes    Prior SNF/Rehab Placement and Facility: no   Agreeable to SNF/Rehab: No  [place] of choice provided: yes   Evaluation: yes    Expected Discharge date:     Patient expects to be discharged to:  Home  Follow Up Appointment: Best Day/ Time:      Transportation provider: Spouse lives with and transports  Transportation arrangements needed for discharge: No    Readmission Risk              Risk of Unplanned Readmission:        9             Does patient have a readmission risk score greater than 14?: No  If yes, follow-up appointment must be made within 7 days of discharge. Discharge Plan: home with spouse. No needs at this time unless IV home therapy ordered. Pt ambulates around the room independently. Has no dme nor a need for any assistive device at this time.  Uses [facility] on [facility] in  [address] [place] [address]          Electronically signed by NILSA Contreras, MELLOW on 19 at 12:55 PM

## 2019-08-14 NOTE — FLOWSHEET NOTE
Patient receives Sacrament of the Sick (anointing) from [facility].    [facility] will follow as needed. (writer charting for [employer].)     73/88/01 7562   Encounter Summary   Services provided to: Patient   Referral/Consult From: Rounding   Continue Visiting   (8/14/19 anointed)   Complexity of Encounter Low   Length of Encounter 15 minutes   Routine   Type Initial   Sacraments   Sacrament of Sick-Anointing Anointed  (8/14/19 Fr. Maurizio Kovacs)

## 2019-08-15 ENCOUNTER — ANESTHESIA EVENT (OUTPATIENT)
Dept: OPERATING ROOM | Age: 59
DRG: 858 | End: 2019-08-15
Payer: COMMERCIAL

## 2019-08-15 ENCOUNTER — ANESTHESIA (OUTPATIENT)
Dept: OPERATING ROOM | Age: 59
DRG: 858 | End: 2019-08-15
Payer: COMMERCIAL

## 2019-08-15 VITALS
DIASTOLIC BLOOD PRESSURE: 50 MMHG | RESPIRATION RATE: 20 BRPM | SYSTOLIC BLOOD PRESSURE: 93 MMHG | OXYGEN SATURATION: 100 %

## 2019-08-15 LAB
ANION GAP SERPL CALCULATED.3IONS-SCNC: 12 MMOL/L (ref 9–17)
BUN BLDV-MCNC: 10 MG/DL (ref 8–23)
BUN/CREAT BLD: 14 (ref 9–20)
CALCIUM SERPL-MCNC: 8.9 MG/DL (ref 8.6–10.4)
CHLORIDE BLD-SCNC: 101 MMOL/L (ref 98–107)
CO2: 26 MMOL/L (ref 20–31)
CREAT SERPL-MCNC: 0.7 MG/DL (ref 0.5–0.9)
GFR AFRICAN AMERICAN: >60 ML/MIN
GFR NON-AFRICAN AMERICAN: >60 ML/MIN
GFR SERPL CREATININE-BSD FRML MDRD: NORMAL ML/MIN/{1.73_M2}
GFR SERPL CREATININE-BSD FRML MDRD: NORMAL ML/MIN/{1.73_M2}
GLUCOSE BLD-MCNC: 99 MG/DL (ref 70–99)
HCT VFR BLD CALC: 35.1 % (ref 36.3–47.1)
HEMOGLOBIN: 11.6 G/DL (ref 11.9–15.1)
MCH RBC QN AUTO: 30.9 PG (ref 25.2–33.5)
MCHC RBC AUTO-ENTMCNC: 33 G/DL (ref 28.4–34.8)
MCV RBC AUTO: 93.4 FL (ref 82.6–102.9)
NRBC AUTOMATED: 0 PER 100 WBC
PDW BLD-RTO: 12.1 % (ref 11.8–14.4)
PLATELET # BLD: 407 K/UL (ref 138–453)
PMV BLD AUTO: 8.8 FL (ref 8.1–13.5)
POTASSIUM SERPL-SCNC: 4 MMOL/L (ref 3.7–5.3)
RBC # BLD: 3.76 M/UL (ref 3.95–5.11)
SODIUM BLD-SCNC: 139 MMOL/L (ref 135–144)
VANCOMYCIN TROUGH DATE LAST DOSE: NORMAL
VANCOMYCIN TROUGH DOSE AMOUNT: NORMAL
VANCOMYCIN TROUGH TIME LAST DOSE: NORMAL
VANCOMYCIN TROUGH: 12.3 UG/ML (ref 10–20)
WBC # BLD: 8.4 K/UL (ref 3.5–11.3)

## 2019-08-15 PROCEDURE — 87186 SC STD MICRODIL/AGAR DIL: CPT

## 2019-08-15 PROCEDURE — 85027 COMPLETE CBC AUTOMATED: CPT

## 2019-08-15 PROCEDURE — APPSS45 APP SPLIT SHARED TIME 31-45 MINUTES: Performed by: NURSE PRACTITIONER

## 2019-08-15 PROCEDURE — 80202 ASSAY OF VANCOMYCIN: CPT

## 2019-08-15 PROCEDURE — 1200000000 HC SEMI PRIVATE

## 2019-08-15 PROCEDURE — 6360000002 HC RX W HCPCS: Performed by: PLASTIC SURGERY

## 2019-08-15 PROCEDURE — 2580000003 HC RX 258: Performed by: NURSE PRACTITIONER

## 2019-08-15 PROCEDURE — 6360000002 HC RX W HCPCS: Performed by: ANESTHESIOLOGY

## 2019-08-15 PROCEDURE — 2580000003 HC RX 258: Performed by: INTERNAL MEDICINE

## 2019-08-15 PROCEDURE — 6360000002 HC RX W HCPCS: Performed by: INTERNAL MEDICINE

## 2019-08-15 PROCEDURE — 3700000000 HC ANESTHESIA ATTENDED CARE: Performed by: PLASTIC SURGERY

## 2019-08-15 PROCEDURE — 3600000012 HC SURGERY LEVEL 2 ADDTL 15MIN: Performed by: PLASTIC SURGERY

## 2019-08-15 PROCEDURE — 2500000003 HC RX 250 WO HCPCS: Performed by: PLASTIC SURGERY

## 2019-08-15 PROCEDURE — 87075 CULTR BACTERIA EXCEPT BLOOD: CPT

## 2019-08-15 PROCEDURE — 2580000003 HC RX 258: Performed by: PLASTIC SURGERY

## 2019-08-15 PROCEDURE — 99232 SBSQ HOSP IP/OBS MODERATE 35: CPT | Performed by: INTERNAL MEDICINE

## 2019-08-15 PROCEDURE — 80048 BASIC METABOLIC PNL TOTAL CA: CPT

## 2019-08-15 PROCEDURE — 87205 SMEAR GRAM STAIN: CPT

## 2019-08-15 PROCEDURE — 2500000003 HC RX 250 WO HCPCS: Performed by: ANESTHESIOLOGY

## 2019-08-15 PROCEDURE — 6370000000 HC RX 637 (ALT 250 FOR IP): Performed by: NURSE PRACTITIONER

## 2019-08-15 PROCEDURE — 2709999900 HC NON-CHARGEABLE SUPPLY: Performed by: PLASTIC SURGERY

## 2019-08-15 PROCEDURE — 3600000002 HC SURGERY LEVEL 2 BASE: Performed by: PLASTIC SURGERY

## 2019-08-15 PROCEDURE — 87070 CULTURE OTHR SPECIMN AEROBIC: CPT

## 2019-08-15 PROCEDURE — 3700000001 HC ADD 15 MINUTES (ANESTHESIA): Performed by: PLASTIC SURGERY

## 2019-08-15 PROCEDURE — 87077 CULTURE AEROBIC IDENTIFY: CPT

## 2019-08-15 PROCEDURE — 7100000011 HC PHASE II RECOVERY - ADDTL 15 MIN: Performed by: PLASTIC SURGERY

## 2019-08-15 PROCEDURE — 6370000000 HC RX 637 (ALT 250 FOR IP): Performed by: PLASTIC SURGERY

## 2019-08-15 PROCEDURE — 36415 COLL VENOUS BLD VENIPUNCTURE: CPT

## 2019-08-15 PROCEDURE — 0J960ZZ DRAINAGE OF CHEST SUBCUTANEOUS TISSUE AND FASCIA, OPEN APPROACH: ICD-10-PCS | Performed by: PLASTIC SURGERY

## 2019-08-15 PROCEDURE — 7100000010 HC PHASE II RECOVERY - FIRST 15 MIN: Performed by: PLASTIC SURGERY

## 2019-08-15 RX ORDER — OXYCODONE HYDROCHLORIDE AND ACETAMINOPHEN 5; 325 MG/1; MG/1
1 TABLET ORAL
Status: DISCONTINUED | OUTPATIENT
Start: 2019-08-15 | End: 2019-08-15 | Stop reason: HOSPADM

## 2019-08-15 RX ORDER — MIDAZOLAM HYDROCHLORIDE 1 MG/ML
INJECTION INTRAMUSCULAR; INTRAVENOUS PRN
Status: DISCONTINUED | OUTPATIENT
Start: 2019-08-15 | End: 2019-08-15 | Stop reason: SDUPTHER

## 2019-08-15 RX ORDER — LIDOCAINE HYDROCHLORIDE 20 MG/ML
INJECTION, SOLUTION INFILTRATION; PERINEURAL PRN
Status: DISCONTINUED | OUTPATIENT
Start: 2019-08-15 | End: 2019-08-15 | Stop reason: SDUPTHER

## 2019-08-15 RX ORDER — MEPERIDINE HYDROCHLORIDE 50 MG/ML
12.5 INJECTION INTRAMUSCULAR; INTRAVENOUS; SUBCUTANEOUS
Status: COMPLETED | OUTPATIENT
Start: 2019-08-15 | End: 2019-08-15

## 2019-08-15 RX ORDER — LIDOCAINE HYDROCHLORIDE AND EPINEPHRINE 10; 10 MG/ML; UG/ML
INJECTION, SOLUTION INFILTRATION; PERINEURAL PRN
Status: DISCONTINUED | OUTPATIENT
Start: 2019-08-15 | End: 2019-08-15 | Stop reason: ALTCHOICE

## 2019-08-15 RX ORDER — FENTANYL CITRATE 50 UG/ML
INJECTION, SOLUTION INTRAMUSCULAR; INTRAVENOUS PRN
Status: DISCONTINUED | OUTPATIENT
Start: 2019-08-15 | End: 2019-08-15 | Stop reason: SDUPTHER

## 2019-08-15 RX ORDER — FENTANYL CITRATE 50 UG/ML
25 INJECTION, SOLUTION INTRAMUSCULAR; INTRAVENOUS EVERY 5 MIN PRN
Status: DISCONTINUED | OUTPATIENT
Start: 2019-08-15 | End: 2019-08-15 | Stop reason: HOSPADM

## 2019-08-15 RX ORDER — ONDANSETRON 2 MG/ML
4 INJECTION INTRAMUSCULAR; INTRAVENOUS
Status: DISCONTINUED | OUTPATIENT
Start: 2019-08-15 | End: 2019-08-15 | Stop reason: HOSPADM

## 2019-08-15 RX ORDER — PROPOFOL 10 MG/ML
INJECTION, EMULSION INTRAVENOUS CONTINUOUS PRN
Status: DISCONTINUED | OUTPATIENT
Start: 2019-08-15 | End: 2019-08-15 | Stop reason: SDUPTHER

## 2019-08-15 RX ORDER — FENTANYL CITRATE 50 UG/ML
50 INJECTION, SOLUTION INTRAMUSCULAR; INTRAVENOUS EVERY 5 MIN PRN
Status: DISCONTINUED | OUTPATIENT
Start: 2019-08-15 | End: 2019-08-15 | Stop reason: HOSPADM

## 2019-08-15 RX ADMIN — CEFEPIME HYDROCHLORIDE 1 G: 1 INJECTION, POWDER, FOR SOLUTION INTRAMUSCULAR; INTRAVENOUS at 16:07

## 2019-08-15 RX ADMIN — SODIUM CHLORIDE: 9 INJECTION, SOLUTION INTRAVENOUS at 09:30

## 2019-08-15 RX ADMIN — LIDOCAINE HYDROCHLORIDE 5 ML: 20 INJECTION, SOLUTION INFILTRATION; PERINEURAL at 18:12

## 2019-08-15 RX ADMIN — PROPOFOL 100 MCG/KG/MIN: 10 INJECTION, EMULSION INTRAVENOUS at 18:12

## 2019-08-15 RX ADMIN — MIDAZOLAM 2 MG: 1 INJECTION INTRAMUSCULAR; INTRAVENOUS at 18:12

## 2019-08-15 RX ADMIN — VANCOMYCIN HYDROCHLORIDE 1250 MG: 5 INJECTION, POWDER, LYOPHILIZED, FOR SOLUTION INTRAVENOUS at 12:19

## 2019-08-15 RX ADMIN — Medication 100 MCG: at 18:12

## 2019-08-15 RX ADMIN — MEPERIDINE HYDROCHLORIDE 12.5 MG: 50 INJECTION, SOLUTION INTRAMUSCULAR; INTRAVENOUS; SUBCUTANEOUS at 18:51

## 2019-08-15 RX ADMIN — ACETAMINOPHEN 650 MG: 325 TABLET ORAL at 22:08

## 2019-08-15 RX ADMIN — FENTANYL CITRATE 50 MCG: 50 INJECTION, SOLUTION INTRAMUSCULAR; INTRAVENOUS at 18:55

## 2019-08-15 RX ADMIN — VITAMIN D, TAB 1000IU (100/BT) 2000 UNITS: 25 TAB at 09:37

## 2019-08-15 RX ADMIN — Medication 10 ML: at 22:03

## 2019-08-15 ASSESSMENT — PULMONARY FUNCTION TESTS
PIF_VALUE: 1
PIF_VALUE: 0
PIF_VALUE: 1
PIF_VALUE: 0
PIF_VALUE: 1

## 2019-08-15 ASSESSMENT — PAIN DESCRIPTION - PROGRESSION
CLINICAL_PROGRESSION: GRADUALLY WORSENING
CLINICAL_PROGRESSION: GRADUALLY WORSENING

## 2019-08-15 ASSESSMENT — PAIN SCALES - GENERAL
PAINLEVEL_OUTOF10: 3
PAINLEVEL_OUTOF10: 3
PAINLEVEL_OUTOF10: 7
PAINLEVEL_OUTOF10: 7
PAINLEVEL_OUTOF10: 0
PAINLEVEL_OUTOF10: 2
PAINLEVEL_OUTOF10: 1

## 2019-08-15 ASSESSMENT — PAIN DESCRIPTION - DESCRIPTORS
DESCRIPTORS: ACHING;DISCOMFORT
DESCRIPTORS: DISCOMFORT;SORE
DESCRIPTORS: BURNING;ACHING

## 2019-08-15 ASSESSMENT — PAIN - FUNCTIONAL ASSESSMENT
PAIN_FUNCTIONAL_ASSESSMENT: ACTIVITIES ARE NOT PREVENTED
PAIN_FUNCTIONAL_ASSESSMENT: ACTIVITIES ARE NOT PREVENTED

## 2019-08-15 ASSESSMENT — PAIN DESCRIPTION - PAIN TYPE
TYPE: SURGICAL PAIN
TYPE: ACUTE PAIN
TYPE: SURGICAL PAIN

## 2019-08-15 ASSESSMENT — PAIN DESCRIPTION - LOCATION
LOCATION: BREAST
LOCATION: BREAST
LOCATION: RIB CAGE

## 2019-08-15 ASSESSMENT — PAIN DESCRIPTION - ORIENTATION
ORIENTATION: LEFT

## 2019-08-15 ASSESSMENT — PAIN DESCRIPTION - ONSET
ONSET: ON-GOING
ONSET: ON-GOING

## 2019-08-15 ASSESSMENT — PAIN DESCRIPTION - FREQUENCY
FREQUENCY: CONTINUOUS
FREQUENCY: CONTINUOUS

## 2019-08-15 NOTE — OP NOTE
Department of Plastic & Reconstructive Surgery   Operative Report        Pre-operative Diagnosis:  Left breast infection    Post-operative Diagnosis:  Left breast deep space abscess    Procedure: Incison and drainge of left breast abscess. Debridement of skin, subcutaneous fat, and fascia  Partial capsulectomy    Surgeon:  Baron Romario GARG     Assistant(s):  none    Anesthesia:  Local anesthesia with sedation    Estimated blood loss:  Minimal    Total IV fluids:  Per anesthesia ml    Blood Transfusion?:  No    Total Urine Output:  Per anesthesia     Drains:  penrose    Specimens:  cultures    Implants:  none    Findings:  Large submuscular abcess. Complications:  none          Indications: Patient is a 75-year-old female who previously underwent left breast implant removal and periareolar mastopexy. She developed infection of her left breast which is been treated for greater than 24 hours of IV antibiotics as inpatient. She developed localization of the infection and now presents with increased purulent drainage from her prior JEREMIAH site. Today she is being taken the operating room for incision and drainage of her left breast infection. We discussed indications, alternatives, risk, benefits, expected scar pattern associated with the discussed procedure. Together we discussed the possibility of change in breast size shape or symmetry secondary to the infection but not related to drainage of the abscess. The patient signed informed consent was eager to proceed with surgery. Technique    Patient was met in the preoperative area and marked for the discussed procedure. She was shown her proposed incisional pattern she is agreeable to proceed. Received preoperative IV antibiotics per infectious disease. She had bilateral lower extremity PAS was applied. She then taken the operating room and laid supine on the operating room table. Monitored anesthesia care was induced and the airway was secured.   The procedure began injecting local anesthesia containing 1% lidocaine with 1-100,000 epinephrine into the region of the prior inframammary fold incision. After allowing appropriate time for anesthetic as well as vasoconstrictive effect to take place the patient was prepped and draped sterilely. Formal timeout was performed in the room in our agreement. The procedure began by removing the Dermabond from the prior intramammary fold scar and removing the prior sutures in the cutaneous closure. Blunt dissection carried down into the prior location of the implant. The subcutaneous tissue in the inframammary fold was well adherent however blunt dissection carried medially to the area of the abdomen redness and fluctuance. Very quickly a large rush of purulent fluid was encountered. This was sent for aerobic and anaerobic cultures. The pocket was evacuated of a copious amount of purulence. Dissection continued bluntly into this space where there was a small amount of adherent tissue from the breast to the ribs at the inferior medial aspect of the prior explant site. The pocket was copiously irrigated with triple antibiotic. At this point the pocket was inspected for foreign bodies. There are several aspects of the pocket with necrotic fat as well as what appeared to be fascia and capsule remaining at the superior and medial aspects of the prior implant pocket. Significant amount of either muscle and/or fascia with capsule was encountered at its posterior surface as well. Occult to determine the exact contents of the necrotic tissue given the amount of inflammation surrounding the entire pocket. The entire prior implant pocket was inspected bluntly with finger dissection. This tracked all the way over to the prior JEREMIAH site which was found. There was another pocket which communicated through the superior space that connected laterally to the prior JEREMIAH site.   This was again copiously irrigated and ensured to be

## 2019-08-15 NOTE — CONSULTS
negative  HEENT:  negative  RESPIRATORY:  negative  CARDIOVASCULAR:  negative  GASTROINTESTINAL:  negative  GENITOURINARY:  negative  INTEGUMENT/BREAST:  Left breast discomfort  HEMATOLOGIC/LYMPHATIC:  negative  ALLERGIC/IMMUNOLOGIC:  negative  ENDOCRINE:  negative  MUSCULOSKELETAL:  negative  NEUROLOGICAL:  negative    PHYSICAL EXAM:  VITALS:  /67   Pulse 66   Temp 98.6 °F (37 °C) (Oral)   Resp 16   Ht 5' 6\" (1.676 m)   Wt 125 lb (56.7 kg)   SpO2 100%   BMI 20.18 kg/m²   24HR INTAKE/OUTPUT:    Intake/Output Summary (Last 24 hours) at 8/14/2019 2027  Last data filed at 8/14/2019 1845  Gross per 24 hour   Intake 198.75 ml   Output 1900 ml   Net -1701.25 ml     CONSTITUTIONAL:  awake, alert, cooperative, no apparent distress, and appears stated age  EYES:  Lids and lashes normal, pupils equal, round and reactive to light, extra ocular muscles intact, sclera clear, conjunctiva normal  ENT:  Normocephalic, without obvious abnormality, atramatic, sinuses nontender on palpation, external ears without lesions, oral pharynx with moist mucus membranes, tonsils without erythema or exudates, gums normal and good dentition.   NECK:  Supple, symmetrical, trachea midline, no adenopathy, thyroid symmetric, not enlarged and no tenderness, skin normal  HEMATOLOGIC/LYMPHATICS:  no cervical lymphadenopathy and no supraclavicular lymphadenopathy  BACK:  Symmetric, no curvature, spinous processes are non-tender on palpation, paraspinous muscles are non-tender on palpation, no costal vertebral tenderness  LUNGS:  No increased work of breathing, good air exchange, clear to auscultation bilaterally, no crackles or wheezing  CARDIOVASCULAR:  Normal apical impulse, regular rate and rhythm, normal S1 and S2, no S3 or S4, and no murmur noted  ABDOMEN:  No scars, normal bowel sounds, soft, non-distended, non-tender, no masses palpated, no hepatosplenomegally  CHEST/BREASTS:  Left breast with lower pole indurration wnad purulent drainage from left JEREMIAH drain removal site. No obvious fluid collection although indurration present throughout  GENITAL/URINARY:  refused  MUSCULOSKELETAL:  No redness pr erythema. supple  NEUROLOGIC:  Awake, alert, oriented to name, place and time. Cranial nerves II-XII are grossly intact. Motor is 5 out of 5 bilaterally. Cerebellar finger to nose, heel to shin intact. Sensory is intact. Babinski down going, Romberg negative, and gait is normal.  SKIN:  Body Locations:  erythem and ecchymosis left breast    DATA:      IMPRESSION/RECOMMENDATIONS:    62year old female s/p explant of bilateral silicone implants and periareolar mastopexy. SHe is now experiencing improving her her erythema but is likely experiencing an evolving abscess. Continue iv abx per ID for another 12 hours. I will evaluate in AM and as long as she is continuing to localize I suspect she will be ready for drainage procedure in the evening tomorrow. Will re-evaluate in the AM/Afternoon. Please keep NPO after breakfast in anticipation of possible washout in the evening. SHe will be ok for discharge from OR with drain and outpatient follow up with culture directed oral abx. Please call with questions or concerns.  [phone]    [facility]

## 2019-08-16 VITALS
TEMPERATURE: 98.2 F | DIASTOLIC BLOOD PRESSURE: 56 MMHG | BODY MASS INDEX: 20.01 KG/M2 | HEART RATE: 70 BPM | OXYGEN SATURATION: 99 % | RESPIRATION RATE: 16 BRPM | WEIGHT: 124.5 LBS | HEIGHT: 66 IN | SYSTOLIC BLOOD PRESSURE: 104 MMHG

## 2019-08-16 LAB
CULTURE: ABNORMAL
DIRECT EXAM: ABNORMAL
DIRECT EXAM: ABNORMAL
HCT VFR BLD CALC: 35.3 % (ref 36.3–47.1)
HEMOGLOBIN: 11.9 G/DL (ref 11.9–15.1)
Lab: ABNORMAL
MCH RBC QN AUTO: 31.2 PG (ref 25.2–33.5)
MCHC RBC AUTO-ENTMCNC: 33.7 G/DL (ref 28.4–34.8)
MCV RBC AUTO: 92.4 FL (ref 82.6–102.9)
NRBC AUTOMATED: 0 PER 100 WBC
PDW BLD-RTO: 11.9 % (ref 11.8–14.4)
PLATELET # BLD: 432 K/UL (ref 138–453)
PMV BLD AUTO: 8.6 FL (ref 8.1–13.5)
RBC # BLD: 3.82 M/UL (ref 3.95–5.11)
SPECIMEN DESCRIPTION: ABNORMAL
WBC # BLD: 9.8 K/UL (ref 3.5–11.3)

## 2019-08-16 PROCEDURE — 6370000000 HC RX 637 (ALT 250 FOR IP): Performed by: PLASTIC SURGERY

## 2019-08-16 PROCEDURE — 2580000003 HC RX 258: Performed by: PLASTIC SURGERY

## 2019-08-16 PROCEDURE — 99238 HOSP IP/OBS DSCHRG MGMT 30/<: CPT | Performed by: INTERNAL MEDICINE

## 2019-08-16 PROCEDURE — 36415 COLL VENOUS BLD VENIPUNCTURE: CPT

## 2019-08-16 PROCEDURE — 6370000000 HC RX 637 (ALT 250 FOR IP): Performed by: INTERNAL MEDICINE

## 2019-08-16 PROCEDURE — 85027 COMPLETE CBC AUTOMATED: CPT

## 2019-08-16 PROCEDURE — 6360000002 HC RX W HCPCS: Performed by: PLASTIC SURGERY

## 2019-08-16 RX ORDER — CIPROFLOXACIN 500 MG/1
500 TABLET, FILM COATED ORAL EVERY 12 HOURS SCHEDULED
Status: DISCONTINUED | OUTPATIENT
Start: 2019-08-16 | End: 2019-08-16 | Stop reason: HOSPADM

## 2019-08-16 RX ORDER — CEFDINIR 300 MG/1
300 CAPSULE ORAL 2 TIMES DAILY
Qty: 18 CAPSULE | Refills: 0 | Status: SHIPPED | OUTPATIENT
Start: 2019-08-16 | End: 2019-08-25

## 2019-08-16 RX ORDER — OXYCODONE HYDROCHLORIDE AND ACETAMINOPHEN 5; 325 MG/1; MG/1
1 TABLET ORAL EVERY 6 HOURS PRN
Qty: 15 TABLET | Refills: 0 | Status: SHIPPED | OUTPATIENT
Start: 2019-08-16 | End: 2019-08-21

## 2019-08-16 RX ORDER — CIPROFLOXACIN 500 MG/1
500 TABLET, FILM COATED ORAL EVERY 12 HOURS SCHEDULED
Qty: 18 TABLET | Refills: 0 | Status: SHIPPED | OUTPATIENT
Start: 2019-08-16 | End: 2019-08-16 | Stop reason: HOSPADM

## 2019-08-16 RX ADMIN — VITAMIN D, TAB 1000IU (100/BT) 2000 UNITS: 25 TAB at 08:08

## 2019-08-16 RX ADMIN — ACETAMINOPHEN 650 MG: 325 TABLET ORAL at 11:13

## 2019-08-16 RX ADMIN — CEFEPIME HYDROCHLORIDE 1 G: 1 INJECTION, POWDER, FOR SOLUTION INTRAMUSCULAR; INTRAVENOUS at 04:08

## 2019-08-16 RX ADMIN — ACETAMINOPHEN 650 MG: 325 TABLET ORAL at 04:10

## 2019-08-16 RX ADMIN — VANCOMYCIN HYDROCHLORIDE 1250 MG: 5 INJECTION, POWDER, LYOPHILIZED, FOR SOLUTION INTRAVENOUS at 00:22

## 2019-08-16 RX ADMIN — CIPROFLOXACIN 500 MG: 500 TABLET ORAL at 11:13

## 2019-08-16 ASSESSMENT — PAIN DESCRIPTION - LOCATION: LOCATION: BREAST

## 2019-08-16 ASSESSMENT — PAIN DESCRIPTION - DESCRIPTORS: DESCRIPTORS: DISCOMFORT;SORE

## 2019-08-16 ASSESSMENT — PAIN DESCRIPTION - PROGRESSION: CLINICAL_PROGRESSION: GRADUALLY WORSENING

## 2019-08-16 ASSESSMENT — PAIN DESCRIPTION - PAIN TYPE: TYPE: SURGICAL PAIN

## 2019-08-16 ASSESSMENT — PAIN DESCRIPTION - FREQUENCY: FREQUENCY: CONTINUOUS

## 2019-08-16 ASSESSMENT — PAIN DESCRIPTION - ORIENTATION: ORIENTATION: LEFT

## 2019-08-16 ASSESSMENT — PAIN DESCRIPTION - ONSET: ONSET: ON-GOING

## 2019-08-16 ASSESSMENT — PAIN SCALES - GENERAL
PAINLEVEL_OUTOF10: 4
PAINLEVEL_OUTOF10: 4

## 2019-08-16 ASSESSMENT — PAIN - FUNCTIONAL ASSESSMENT: PAIN_FUNCTIONAL_ASSESSMENT: ACTIVITIES ARE NOT PREVENTED

## 2019-08-16 NOTE — DISCHARGE SUMMARY
chloride 0.65 % nasal spray  Commonly known as:  OCEAN, BABY AYR     valACYclovir 1 g tablet  Commonly known as:  VALTREX     vitamin D 2000 units Tabs tablet        STOP taking these medications    aspirin 81 MG tablet     cephALEXin 500 MG capsule  Commonly known as:  KEFLEX     doxycycline hyclate 100 MG tablet  Commonly known as:  VIBRA-TABS     Magnesium Citrate 100 MG Tabs     melatonin 3 MG Tabs tablet     therapeutic multivitamin-minerals tablet           Where to Get Your Medications      These medications were sent to [address] [facility], [address] [place] [address] [phone]  [address], [address] [facility] [address]    Phone:  [phone]   · cefdinir 300 MG capsule     You can get these medications from any pharmacy    Bring a paper prescription for each of these medications  · oxyCODONE-acetaminophen 5-325 MG per tablet         Time Spent on discharge is  20 mins in patient examination, evaluation, counseling as well as medication reconciliation, prescriptions for required medications, discharge plan and follow up. Electronically signed by   Nia Donaldson DO  8/16/2019  2:25 PM      Thank you Dr. Nadine Wise MD for the opportunity to be involved in this patient's care.

## 2019-08-16 NOTE — PROGRESS NOTES
last 72 hours. I/O (24Hr): Intake/Output Summary (Last 24 hours) at 8/16/2019 0837  Last data filed at 8/16/2019 0548  Gross per 24 hour   Intake 1939 ml   Output --   Net 1939 ml       Labs:  Hematology:  Recent Labs     08/13/19  2248 08/15/19  0434 08/16/19  0550   WBC 9.8 8.4 9.8   RBC 3.83* 3.76* 3.82*   HGB 12.0 11.6* 11.9   HCT 35.7* 35.1* 35.3*   MCV 93.2 93.4 92.4   MCH 31.3 30.9 31.2   MCHC 33.6 33.0 33.7   RDW 12.2 12.1 11.9    407 432   MPV 9.2 8.8 8.6     Chemistry:  Recent Labs     08/13/19  2349 08/15/19  0434    139   K 3.9 4.0   CL 99 101   CO2 26 26   GLUCOSE 120* 99   BUN 14 10   CREATININE 0.76 0.70   ANIONGAP 13 12   LABGLOM >60 >60   GFRAA >60 >60   CALCIUM 9.4 8.9   No results for input(s): PROT, LABALBU, LABA1C, P8UWSZE, I7JMNFL, FT4, TSH, AST, ALT, LDH, GGT, ALKPHOS, LABGGT, BILITOT, BILIDIR, AMMONIA, AMYLASE, LIPASE, LACTATE, CHOL, HDL, LDLCHOLESTEROL, CHOLHDLRATIO, TRIG, VLDL, IWX72UL, PHENYTOIN, PHENYF, URICACID, POCGLU in the last 72 hours. ABG:No results found for: POCPH, PHART, PH, POCPCO2, PYD9EYT, PCO2, POCPO2, PO2ART, PO2, POCHCO3, RIS9IWN, HCO3, NBEA, PBEA, BEART, BE, THGBART, THB, UAD8QZX, OIGM1EPY, B6QKJCIK, O2SAT, FIO2  Lab Results   Component Value Date/Time    SPECIAL NOT REPORTED 08/15/2019 06:23 PM     Lab Results   Component Value Date/Time    CULTURE PENDING 08/15/2019 06:23 PM       Radiology:  No results found.     Physical Examination:        General appearance:  alert, cooperative and no distress  Mental Status:  oriented to person, place and time and normal affect  Lungs:  clear to auscultation bilaterally, normal effort  Heart:  regular rate and rhythm, no murmur  Abdomen:  soft, nontender, nondistended, normal bowel sounds, no masses, hepatomegaly, splenomegaly  Extremities:  no edema, redness, tenderness in the calves  Skin:  Less redness of left breast    Assessment:        Hospital Problems           Last Modified POA    * (Principal) Cellulitis of left breast 8/14/2019 Yes    Chronic fatigue 8/14/2019 Yes    Gastroesophageal reflux disease without esophagitis 8/14/2019 Yes    Breast abscess of female 8/15/2019 Yes          Plan:        1. Change to po cipro for serratia infection  2. Dc vanco and cefepime  3. Dc home  4.  F/u plastics next week    Beto Donaldson,   8/16/2019  8:37 AM

## 2019-08-16 NOTE — PROGRESS NOTES
CLINICAL PHARMACY NOTE: MEDS TO [address] Select Patient?: No  Total # of Prescriptions Filled: 2   The following medications were delivered to the patient:  · PERCOCET 5-325  · CIPROFLOXACIN 500MG  Total # of Interventions Completed: 0  Time Spent (min): 45    Additional Documentation:

## 2019-08-16 NOTE — PLAN OF CARE
Problem: Skin Integrity:  Goal: Will show no infection signs and symptoms  Description  Will show no infection signs and symptoms  8/16/2019 0102 by Anastacio Hampton RN  Outcome: Ongoing  8/15/2019 1823 by Steve Pike RN  Outcome: Ongoing  Note:   Patient has redness that has been outlined prior to admit  Redness has significantly improved  Patient has small opened area draining thick and tan  Incisions s/p breast implant removal are clean dry and intact, healing well  No other impaired skin integrity noted  Goal: Absence of new skin breakdown  Description  Absence of new skin breakdown  Outcome: Ongoing     Problem: Pain:  Description  Pain management should include both nonpharmacologic and pharmacologic interventions. Goal: Pain level will decrease  Description  Pain level will decrease  8/16/2019 0102 by Anastacio Hampton RN  Outcome: Ongoing  Note:   Pain level assessment complete. Patient educated on pain scale and control interventions  PRN pain medication given per patient request  Patient instructed to call out with new onset of pain or unrelieved pain    8/15/2019 1823 by Steve Pike RN  Outcome: Ongoing  Note:   Pain level assessment complete.    Patient educated on pain scale and control interventions  PRN pain medication given per patient request  Patient instructed to call out with new onset of pain or unrelieved pain    Patient only taking tylenol for pain PRN  States pain significantly improved since start of IV atb  Goal: Control of acute pain  Description  Control of acute pain  Outcome: Ongoing  Goal: Control of chronic pain  Description  Control of chronic pain  Outcome: Ongoing

## 2019-08-17 LAB
CULTURE: ABNORMAL
CULTURE: ABNORMAL
DIRECT EXAM: ABNORMAL
DIRECT EXAM: ABNORMAL
Lab: ABNORMAL
SPECIMEN DESCRIPTION: ABNORMAL

## 2019-08-20 LAB
CULTURE: NORMAL
CULTURE: NORMAL
Lab: NORMAL
Lab: NORMAL
SPECIMEN DESCRIPTION: NORMAL
SPECIMEN DESCRIPTION: NORMAL

## 2019-09-17 ENCOUNTER — OFFICE VISIT (OUTPATIENT)
Dept: OBGYN CLINIC | Age: 59
End: 2019-09-17
Payer: COMMERCIAL

## 2019-09-17 VITALS
WEIGHT: 124.4 LBS | HEIGHT: 66 IN | DIASTOLIC BLOOD PRESSURE: 82 MMHG | BODY MASS INDEX: 19.99 KG/M2 | SYSTOLIC BLOOD PRESSURE: 124 MMHG

## 2019-09-17 DIAGNOSIS — N39.0 POSTCOITAL UTI: ICD-10-CM

## 2019-09-17 DIAGNOSIS — Z01.419 WOMEN'S ANNUAL ROUTINE GYNECOLOGICAL EXAMINATION: Primary | ICD-10-CM

## 2019-09-17 DIAGNOSIS — Z12.39 BREAST CANCER SCREENING: ICD-10-CM

## 2019-09-17 DIAGNOSIS — N95.2 ATROPHIC VAGINITIS: ICD-10-CM

## 2019-09-17 PROCEDURE — 99396 PREV VISIT EST AGE 40-64: CPT | Performed by: OBSTETRICS & GYNECOLOGY

## 2019-09-17 RX ORDER — VALACYCLOVIR HYDROCHLORIDE 1 G/1
1000 TABLET, FILM COATED ORAL PRN
Qty: 30 TABLET | Refills: 1 | Status: SHIPPED | OUTPATIENT
Start: 2019-09-17

## 2019-09-17 ASSESSMENT — ENCOUNTER SYMPTOMS
BLOOD IN STOOL: 0
SORE THROAT: 0
NAUSEA: 0
CHEST TIGHTNESS: 0
PHOTOPHOBIA: 0
BACK PAIN: 0
CONSTIPATION: 0
ANAL BLEEDING: 0
TROUBLE SWALLOWING: 0
ABDOMINAL PAIN: 0
RECTAL PAIN: 0
DIARRHEA: 0
APNEA: 0
SHORTNESS OF BREATH: 0
COUGH: 0
ABDOMINAL DISTENTION: 0
WHEEZING: 0

## 2019-09-17 NOTE — PROGRESS NOTES
Hernán Forte is a 62 y.o. [id number], here for her annual exam.  The patient was seen and examined. The patients past medical, surgical, social and family history were reviewed. Current medications and allergies were reviewed, and documented in the chart. SP Moon had the kind of breast implants that were recalled recently and she went to a plastic surgery in [place], [place] to have them removed. Subsequently she developed an abscess in the left breast that required surgical debridement and IV antibiotics for 3 days, at [facility].  [facility]. She was discharged on oral antibiotics which she finished. She then developed a small area thought maybe to be an abscess and was started back on antibiotics 5 days ago. She is finally starting to feel better. 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 She hopes that when she recovers from this implant disaster she will feel better. She also is complaining about pain with sex due to dryness. She prefers no hormonal therapy so alternatives were discussed. She is interested in pursuing treatment with the [facility] and was referred to Dr. Clari Pat. She usually takes a dose of keflex after intercourse to prevent UTI. Since she is currently on a cephalosporin for her breast infection, I did not refill her keflex. She will ask the pharmacy to send a refill request when she needs one. Menopausal history: no HRT; had hysterectomy; Blood pressure 124/82, height 5' 6\" (1.676 m), weight 124 lb 6.4 oz (56.4 kg), not currently breastfeeding.   Wt Readings from Last 3 Encounters:   09/17/19 124 lb 6.4 oz (56.4 kg)   08/16/19 124 lb 8 oz (56.5 kg)   08/12/19 124 lb (56.2 kg)     Recent Results (from the past 8736 hour(s))   CBC Auto 1245 pg/mL   Vitamin D 25 Hydroxy    Collection Time: 02/07/19  3:20 PM   Result Value Ref Range    Vit D, 25-Hydroxy 58.7 30.0 - 100.0 ng/mL   Sedimentation Rate    Collection Time: 02/07/19  3:20 PM   Result Value Ref Range    Sed Rate 2 0 - 20 mm   NAYANA Screen with Reflex    Collection Time: 02/07/19  3:20 PM   Result Value Ref Range    NAYANA NEGATIVE NEG   Rheumatoid Factor    Collection Time: 02/07/19  3:20 PM   Result Value Ref Range    Rheumatoid Factor 10.2 <14 IU/mL   C-Reactive Protein    Collection Time: 02/07/19  3:20 PM   Result Value Ref Range    CRP <0.3 0.0 - 5.0 mg/L   CBC Auto Differential    Collection Time: 08/12/19  4:24 PM   Result Value Ref Range    WBC 12.4 (H) 3.5 - 11.3 k/uL    RBC 4.21 3.95 - 5.11 m/uL    Hemoglobin 13.1 11.9 - 15.1 g/dL    Hematocrit 40.8 36.3 - 47.1 %    MCV 96.9 82.6 - 102.9 fL    MCH 31.1 25.2 - 33.5 pg    MCHC 32.1 28.4 - 34.8 g/dL    RDW 12.4 11.8 - 14.4 %    Platelets 464 453 - 680 k/uL    MPV 9.7 8.1 - 13.5 fL    NRBC Automated 0.0 0.0 per 100 WBC    Differential Type NOT REPORTED     Seg Neutrophils 76 (H) 36 - 65 %    Lymphocytes 15 (L) 24 - 43 %    Monocytes 7 3 - 12 %    Eosinophils % 0 (L) 1 - 4 %    Basophils 1 0 - 2 %    Immature Granulocytes 1 (H) 0 %    Segs Absolute 9.48 (H) 1.50 - 8.10 k/uL    Absolute Lymph # 1.86 1.10 - 3.70 k/uL    Absolute Mono # 0.90 0.10 - 1.20 k/uL    Absolute Eos # <0.03 0.00 - 0.44 k/uL    Basophils Absolute 0.07 0.00 - 0.20 k/uL    Absolute Immature Granulocyte 0.09 0.00 - 0.30 k/uL    WBC Morphology NOT REPORTED     RBC Morphology NOT REPORTED     Platelet Estimate NOT REPORTED    Cult,Wound    Collection Time: 08/13/19 10:31 PM   Result Value Ref Range    Specimen Description . ABSCESS LEFT BREAST     Special Requests NOT REPORTED     Direct Exam FEW NEUTROPHILS (A)     Direct Exam FEW GRAM NEGATIVE RODS (A)     Culture SERRATIA MARCESCENS MODERATE GROWTH (A)        Susceptibility    Serratia marcescens - BACTERIAL SUSCEPTIBILITY PANEL NATIVIDAD     amikacin Value in next row        NOT REPORTED     aztreonam Value in next row Sensitive       <=1SUSCEPTIBLE     ceFAZolin Value in next row Resistant       >=64RESISTANT     cefepime Value in next row        NOT REPORTED     cefTRIAXone Value in next row Sensitive       <=1SUSCEPTIBLE     ciprofloxacin Value in next row Sensitive       <=0.25SUSCEPTIBLE     ertapenem Value in next row        NOT REPORTED     gentamicin Value in next row Sensitive       <=1SUSCEPTIBLE     meropenem Value in next row        NOT REPORTED     nitrofurantoin Value in next row        NOT REPORTED     tigecycline Value in next row        NOT REPORTED     tobramycin Value in next row Sensitive       <=1SUSCEPTIBLE     trimethoprim-sulfamethoxazole Value in next row Sensitive       <=20SUSCEPTIBLE   Culture blood #1    Collection Time: 08/13/19 10:45 PM   Result Value Ref Range    Specimen Description . BLOOD     Special Requests RT AC  12 ML     Culture NO GROWTH 6 DAYS    Culture blood #2    Collection Time: 08/13/19 10:48 PM   Result Value Ref Range    Specimen Description . BLOOD     Special Requests LT AC 12ML     Culture NO GROWTH 6 DAYS    CBC Auto Differential    Collection Time: 08/13/19 10:48 PM   Result Value Ref Range    WBC 9.8 3.5 - 11.3 k/uL    RBC 3.83 (L) 3.95 - 5.11 m/uL    Hemoglobin 12.0 11.9 - 15.1 g/dL    Hematocrit 35.7 (L) 36.3 - 47.1 %    MCV 93.2 82.6 - 102.9 fL    MCH 31.3 25.2 - 33.5 pg    MCHC 33.6 28.4 - 34.8 g/dL    RDW 12.2 11.8 - 14.4 %    Platelets 823 598 - 864 k/uL    MPV 9.2 8.1 - 13.5 fL    NRBC Automated 0.0 0.0 per 100 WBC    Differential Type NOT REPORTED     Seg Neutrophils 64 36 - 65 %    Lymphocytes 24 24 - 43 %    Monocytes 9 3 - 12 %    Eosinophils % 1 1 - 4 %    Basophils 1 0 - 2 %    Immature Granulocytes 1 (H) 0 %    Segs Absolute 6.27 1.50 - 8.10 k/uL    Absolute Lymph # 2.37 1.10 - 3.70 k/uL    Absolute Mono # 0.89 0.10 - 1.20 k/uL    Absolute Eos # 0.07 0.00 - 0.44 k/uL Basophils Absolute 0.06 0.00 - 0.20 k/uL    Absolute Immature Granulocyte 0.12 0.00 - 0.30 k/uL    WBC Morphology NOT REPORTED     RBC Morphology NOT REPORTED     Platelet Estimate NOT REPORTED    Basic Metabolic Panel    Collection Time: 08/13/19 11:49 PM   Result Value Ref Range    Glucose 120 (H) 70 - 99 mg/dL    BUN 14 8 - 23 mg/dL    CREATININE 0.76 0.50 - 0.90 mg/dL    Bun/Cre Ratio 18 9 - 20    Calcium 9.4 8.6 - 10.4 mg/dL    Sodium 138 135 - 144 mmol/L    Potassium 3.9 3.7 - 5.3 mmol/L    Chloride 99 98 - 107 mmol/L    CO2 26 20 - 31 mmol/L    Anion Gap 13 9 - 17 mmol/L    GFR Non-African American >60 >60 mL/min    GFR African American >60 >60 mL/min    GFR Comment          GFR Staging NOT REPORTED    Lactate, Sepsis    Collection Time: 08/13/19 11:49 PM   Result Value Ref Range    Lactic Acid, Sepsis 0.7 0.5 - 1.9 mmol/L    Lactic Acid, Sepsis, Whole Blood NOT REPORTED 0.5 - 1.9 mmol/L   Lactate, Sepsis    Collection Time: 08/14/19  1:41 AM   Result Value Ref Range    Lactic Acid, Sepsis 0.7 0.5 - 1.9 mmol/L    Lactic Acid, Sepsis, Whole Blood NOT REPORTED 0.5 - 1.9 mmol/L   Basic Metabolic Panel w/ Reflex to MG    Collection Time: 08/15/19  4:34 AM   Result Value Ref Range    Glucose 99 70 - 99 mg/dL    BUN 10 8 - 23 mg/dL    CREATININE 0.70 0.50 - 0.90 mg/dL    Bun/Cre Ratio 14 9 - 20    Calcium 8.9 8.6 - 10.4 mg/dL    Sodium 139 135 - 144 mmol/L    Potassium 4.0 3.7 - 5.3 mmol/L    Chloride 101 98 - 107 mmol/L    CO2 26 20 - 31 mmol/L    Anion Gap 12 9 - 17 mmol/L    GFR Non-African American >60 >60 mL/min    GFR African American >60 >60 mL/min    GFR Comment          GFR Staging NOT REPORTED    CBC    Collection Time: 08/15/19  4:34 AM   Result Value Ref Range    WBC 8.4 3.5 - 11.3 k/uL    RBC 3.76 (L) 3.95 - 5.11 m/uL    Hemoglobin 11.6 (L) 11.9 - 15.1 g/dL    Hematocrit 35.1 (L) 36.3 - 47.1 %    MCV 93.4 82.6 - 102.9 fL    MCH 30.9 25.2 - 33.5 pg    MCHC 33.0 28.4 - 34.8 g/dL    RDW 12.1 11.8 - 14.4 % Platelets 415 500 - 504 k/uL    MPV 8.8 8.1 - 13.5 fL    NRBC Automated 0.0 0.0 per 100 WBC   VANCOMYCIN, TROUGH    Collection Time: 08/15/19 10:01 AM   Result Value Ref Range    Vancomycin Tr 12.3 10.0 - 20.0 ug/mL    Vancomycin Trough Dose amount NOT REPORTED     Vancomycin Trough Date last dose NOT REPORTED     Vancomycin Trough Time last dose NOT REPORTED    Anaerobic and Aerobic Culture    Collection Time: 08/15/19  6:23 PM   Result Value Ref Range    Specimen Description . BREAST SWAB     Special Requests NOT REPORTED     Direct Exam NO NEUTROPHILS SEEN     Direct Exam RARE GRAM NEGATIVE RODS (A)     Culture SERRATIA MARCESCENS MODERATE GROWTH (A)     Culture NO ANAEROBIC ORGANISMS ISOLATED AT 5 DAYS (A)        Susceptibility    Serratia marcescens - BACTERIAL SUSCEPTIBILITY PANEL NATIVIDAD     amikacin Value in next row        NOT REPORTED     aztreonam Value in next row Sensitive       <=1SUSCEPTIBLE     ceFAZolin Value in next row Resistant       >=64RESISTANT     cefepime Value in next row        NOT REPORTED     cefTRIAXone Value in next row Sensitive       <=1SUSCEPTIBLE     ciprofloxacin Value in next row Sensitive       <=0.25SUSCEPTIBLE     ertapenem Value in next row        NOT REPORTED     gentamicin Value in next row Sensitive       <=1SUSCEPTIBLE     meropenem Value in next row        NOT REPORTED     nitrofurantoin Value in next row        NOT REPORTED     tigecycline Value in next row        NOT REPORTED     tobramycin Value in next row Sensitive       <=1SUSCEPTIBLE     trimethoprim-sulfamethoxazole Value in next row Sensitive       <=20SUSCEPTIBLE   CBC    Collection Time: 08/16/19  5:50 AM   Result Value Ref Range    WBC 9.8 3.5 - 11.3 k/uL    RBC 3.82 (L) 3.95 - 5.11 m/uL    Hemoglobin 11.9 11.9 - 15.1 g/dL    Hematocrit 35.3 (L) 36.3 - 47.1 %    MCV 92.4 82.6 - 102.9 fL    MCH 31.2 25.2 - 33.5 pg    MCHC 33.7 28.4 - 34.8 g/dL    RDW 11.9 11.8 - 14.4 %    Platelets 785 744 - 569 k/uL    MPV 8.6 8.1 - 13.5 fL    NRBC Automated 0.0 0.0 per 100 WBC     Past Medical History:   Diagnosis Date    Acid reflux     Cellulitis 08/2019    Fracture of thumb     20's    Hypokalemia     Nasal bone fx-closed     as a child      Past Surgical History:   Procedure Laterality Date    BLADDER REMOVAL  1986     cysts removed x 2    BREAST ENHANCEMENT SURGERY Bilateral     1995 implants placed under muscle    BREAST REDUCTION SURGERY Bilateral     July 2019    BREAST SURGERY Left 08/15/2019    I&D left breast    BREAST SURGERY Left 8/15/2019    BREAST INCISION AND DRAINAGE performed by Aubree Portillo MD at [facility] Bilateral 10/2017    [address] [place] [address], 1989    x 2    COLONOSCOPY  2015    negative    DILATION AND CURETTAGE OF UTERUS  1981    miscarriages    DILATION AND CURETTAGE OF UTERUS  1998    dsyplasia    HERNIA REPAIR  2010    HYSTERECTOMY, TOTAL ABDOMINAL  1998    RHINOPLASTY  1998    TONSILLECTOMY AND ADENOIDECTOMY      1 yo     Family History   Problem Relation Age of Onset    Heart Disease Mother         CABGx 3 , MI   Valeri He Hypertension Mother    Valeri He Arthritis Mother     Dementia Mother     Osteoporosis Mother     Cancer Mother [de-identified]        vulvar     Cervical Cancer Maternal Grandmother     Cancer Paternal Grandfather         throat    Hypertension Brother     Heart Disease Brother     Coronary Art Dis Brother     Asthma Brother     Heart Failure Father      Social History     Tobacco Use   Smoking Status Never Smoker   Smokeless Tobacco Never Used     Social History     Substance and Sexual Activity   Alcohol Use Yes    Comment: occ       MEDICATIONS:  Current Outpatient Medications   Medication Sig Dispense Refill    valACYclovir (VALTREX) 1 g tablet Take 1 tablet by mouth as needed (1 tablet po Q12H prn for cold sores) 30 tablet 1    Omega-3 Fatty Acids (FISH OIL) 500 MG CAPS Take 500 mg by mouth 2 times daily      sodium chloride (OCEAN, BABY AYR) 0.65 % nasal spray 1 spray by Nasal route 2 times daily      Cholecalciferol (VITAMIN D) 2000 units TABS tablet Take 2,000 Units by mouth daily       Probiotic Product (PROBIOTIC-10 PO) Take 1 tablet by mouth daily       NONFORMULARY Acid ease prn      metroNIDAZOLE (METROGEL) 0.75 % gel Apply topically 2 times daily Indications: apply to face for rosacea       Potassium 99 MG TABS Take 1 tablet by mouth 2 times daily        No current facility-administered medications for this visit. ALLERGIES:  Macrodantin [nitrofurantoin] and Sulfa antibiotics    Review of Systems   Constitutional: Negative for activity change, appetite change, fatigue, fever and unexpected weight change. HENT: Negative for congestion, dental problem, hearing loss, mouth sores, sore throat and trouble swallowing. Eyes: Negative for photophobia and visual disturbance. Respiratory: Negative for apnea, cough, chest tightness, shortness of breath and wheezing. Cardiovascular: Negative for chest pain, palpitations and leg swelling. Gastrointestinal: Negative for abdominal distention, abdominal pain, anal bleeding, blood in stool, constipation, diarrhea, nausea and rectal pain. Endocrine: Negative for cold intolerance, heat intolerance, polydipsia, polyphagia and polyuria. Genitourinary: Negative for difficulty urinating, dyspareunia, dysuria, flank pain, frequency, genital sores, hematuria, menstrual problem, pelvic pain, urgency, vaginal bleeding and vaginal discharge. Musculoskeletal: Negative for arthralgias, back pain and myalgias. Skin: Positive for wound. Negative for pallor and rash. Allergic/Immunologic: Negative for environmental allergies, food allergies and immunocompromised state. Neurological: Negative for dizziness, seizures, syncope, weakness, numbness and headaches. Hematological: Negative for adenopathy.    Psychiatric/Behavioral: Negative for agitation, behavioral problems, confusion, decreased concentration,

## 2019-10-02 ENCOUNTER — TELEPHONE (OUTPATIENT)
Dept: FAMILY MEDICINE CLINIC | Age: 59
End: 2019-10-02

## 2019-10-15 ENCOUNTER — HOSPITAL ENCOUNTER (OUTPATIENT)
Age: 59
Setting detail: SPECIMEN
Discharge: HOME | End: 2019-10-15
Payer: COMMERCIAL

## 2019-10-15 ENCOUNTER — OFFICE VISIT (OUTPATIENT)
Dept: FAMILY MEDICINE CLINIC | Age: 59
End: 2019-10-15
Payer: COMMERCIAL

## 2019-10-15 VITALS
HEIGHT: 66 IN | DIASTOLIC BLOOD PRESSURE: 68 MMHG | SYSTOLIC BLOOD PRESSURE: 110 MMHG | BODY MASS INDEX: 19.77 KG/M2 | HEART RATE: 64 BPM | WEIGHT: 123 LBS

## 2019-10-15 DIAGNOSIS — M25.50 ARTHRALGIA, UNSPECIFIED JOINT: ICD-10-CM

## 2019-10-15 DIAGNOSIS — B99.9 GRANULOMA DUE TO INFECTION: ICD-10-CM

## 2019-10-15 DIAGNOSIS — R53.82 CHRONIC FATIGUE: ICD-10-CM

## 2019-10-15 DIAGNOSIS — L03.818 CELLULITIS OF OTHER SPECIFIED SITE: ICD-10-CM

## 2019-10-15 DIAGNOSIS — E78.00 ELEVATED CHOLESTEROL: ICD-10-CM

## 2019-10-15 DIAGNOSIS — Z00.00 PHYSICAL EXAM, ANNUAL: Primary | ICD-10-CM

## 2019-10-15 DIAGNOSIS — M79.10 MYALGIA: ICD-10-CM

## 2019-10-15 LAB
ABSOLUTE EOS #: 0.04 K/UL (ref 0–0.44)
ABSOLUTE IMMATURE GRANULOCYTE: <0.03 K/UL (ref 0–0.3)
ABSOLUTE LYMPH #: 2.19 K/UL (ref 1.1–3.7)
ABSOLUTE MONO #: 0.32 K/UL (ref 0.1–1.2)
BASOPHILS # BLD: 1 % (ref 0–2)
BASOPHILS ABSOLUTE: 0.04 K/UL (ref 0–0.2)
DIFFERENTIAL TYPE: NORMAL
EOSINOPHILS RELATIVE PERCENT: 1 % (ref 1–4)
HCT VFR BLD CALC: 45.6 % (ref 36.3–47.1)
HEMOGLOBIN: 14.5 G/DL (ref 11.9–15.1)
IMMATURE GRANULOCYTES: 0 %
LYMPHOCYTES # BLD: 34 % (ref 24–43)
MCH RBC QN AUTO: 30.1 PG (ref 25.2–33.5)
MCHC RBC AUTO-ENTMCNC: 31.8 G/DL (ref 28.4–34.8)
MCV RBC AUTO: 94.6 FL (ref 82.6–102.9)
MONOCYTES # BLD: 5 % (ref 3–12)
NRBC AUTOMATED: 0 PER 100 WBC
PDW BLD-RTO: 13 % (ref 11.8–14.4)
PLATELET # BLD: 301 K/UL (ref 138–453)
PLATELET ESTIMATE: NORMAL
PMV BLD AUTO: 10.3 FL (ref 8.1–13.5)
RBC # BLD: 4.82 M/UL (ref 3.95–5.11)
RBC # BLD: NORMAL 10*6/UL
SEG NEUTROPHILS: 59 % (ref 36–65)
SEGMENTED NEUTROPHILS ABSOLUTE COUNT: 3.8 K/UL (ref 1.5–8.1)
WBC # BLD: 6.4 K/UL (ref 3.5–11.3)
WBC # BLD: NORMAL 10*3/UL

## 2019-10-15 PROCEDURE — G8484 FLU IMMUNIZE NO ADMIN: HCPCS | Performed by: FAMILY MEDICINE

## 2019-10-15 PROCEDURE — 99396 PREV VISIT EST AGE 40-64: CPT | Performed by: FAMILY MEDICINE

## 2019-10-15 PROCEDURE — 36415 COLL VENOUS BLD VENIPUNCTURE: CPT | Performed by: FAMILY MEDICINE

## 2019-10-15 ASSESSMENT — ENCOUNTER SYMPTOMS
COUGH: 0
DIARRHEA: 0
BLOOD IN STOOL: 0
ABDOMINAL PAIN: 0
EYES NEGATIVE: 1
SHORTNESS OF BREATH: 0
CONSTIPATION: 0

## 2019-10-16 LAB
ALBUMIN SERPL-MCNC: 5.1 G/DL (ref 3.5–5.2)
ALBUMIN/GLOBULIN RATIO: 2.2 (ref 1–2.5)
ALP BLD-CCNC: 64 U/L (ref 35–104)
ALT SERPL-CCNC: 17 U/L (ref 5–33)
ANION GAP SERPL CALCULATED.3IONS-SCNC: 21 MMOL/L (ref 9–17)
AST SERPL-CCNC: 26 U/L
BILIRUB SERPL-MCNC: 1.04 MG/DL (ref 0.3–1.2)
BUN BLDV-MCNC: 13 MG/DL (ref 8–23)
BUN/CREAT BLD: ABNORMAL (ref 9–20)
CALCIUM SERPL-MCNC: 9.9 MG/DL (ref 8.6–10.4)
CHLORIDE BLD-SCNC: 105 MMOL/L (ref 98–107)
CHOLESTEROL/HDL RATIO: 2.1
CHOLESTEROL: 266 MG/DL
CO2: 21 MMOL/L (ref 20–31)
CREAT SERPL-MCNC: 0.75 MG/DL (ref 0.5–0.9)
GFR AFRICAN AMERICAN: >60 ML/MIN
GFR NON-AFRICAN AMERICAN: >60 ML/MIN
GFR SERPL CREATININE-BSD FRML MDRD: ABNORMAL ML/MIN/{1.73_M2}
GFR SERPL CREATININE-BSD FRML MDRD: ABNORMAL ML/MIN/{1.73_M2}
GLUCOSE BLD-MCNC: 103 MG/DL (ref 70–99)
HDLC SERPL-MCNC: 124 MG/DL
LDL CHOLESTEROL: 127 MG/DL (ref 0–130)
POTASSIUM SERPL-SCNC: 4.1 MMOL/L (ref 3.7–5.3)
SODIUM BLD-SCNC: 147 MMOL/L (ref 135–144)
THYROXINE, FREE: 1.53 NG/DL (ref 0.93–1.7)
TOTAL PROTEIN: 7.4 G/DL (ref 6.4–8.3)
TRIGL SERPL-MCNC: 73 MG/DL
TSH SERPL DL<=0.05 MIU/L-ACNC: 2.02 MIU/L (ref 0.3–5)
VLDLC SERPL CALC-MCNC: ABNORMAL MG/DL (ref 1–30)

## 2019-10-25 ENCOUNTER — TELEPHONE (OUTPATIENT)
Dept: FAMILY MEDICINE CLINIC | Age: 59
End: 2019-10-25

## 2020-01-14 RX ORDER — CEPHALEXIN 250 MG/1
CAPSULE ORAL
Qty: 30 CAPSULE | Refills: 3 | Status: SHIPPED | OUTPATIENT
Start: 2020-01-14 | End: 2021-12-10 | Stop reason: ALTCHOICE

## 2020-01-14 RX ORDER — CLOTRIMAZOLE AND BETAMETHASONE DIPROPIONATE 10; .64 MG/G; MG/G
CREAM TOPICAL
Qty: 30 G | Refills: 5 | Status: SHIPPED | OUTPATIENT
Start: 2020-01-14 | End: 2021-12-10 | Stop reason: ALTCHOICE

## 2020-10-26 ENCOUNTER — OFFICE VISIT (OUTPATIENT)
Dept: FAMILY MEDICINE CLINIC | Age: 60
End: 2020-10-26
Payer: COMMERCIAL

## 2020-10-26 ENCOUNTER — HOSPITAL ENCOUNTER (OUTPATIENT)
Age: 60
Setting detail: SPECIMEN
Discharge: HOME | End: 2020-10-26
Payer: COMMERCIAL

## 2020-10-26 VITALS
DIASTOLIC BLOOD PRESSURE: 68 MMHG | TEMPERATURE: 97 F | WEIGHT: 130 LBS | BODY MASS INDEX: 20.89 KG/M2 | SYSTOLIC BLOOD PRESSURE: 100 MMHG | HEIGHT: 66 IN | OXYGEN SATURATION: 99 % | HEART RATE: 59 BPM

## 2020-10-26 PROCEDURE — 99396 PREV VISIT EST AGE 40-64: CPT | Performed by: FAMILY MEDICINE

## 2020-10-26 PROCEDURE — G8484 FLU IMMUNIZE NO ADMIN: HCPCS | Performed by: FAMILY MEDICINE

## 2020-10-26 PROCEDURE — 69210 REMOVE IMPACTED EAR WAX UNI: CPT | Performed by: FAMILY MEDICINE

## 2020-10-26 PROCEDURE — 36415 COLL VENOUS BLD VENIPUNCTURE: CPT | Performed by: FAMILY MEDICINE

## 2020-10-26 RX ORDER — MULTIVIT WITH MINERALS/LUTEIN
1000 TABLET ORAL DAILY
COMMUNITY
End: 2021-04-01

## 2020-10-26 ASSESSMENT — PATIENT HEALTH QUESTIONNAIRE - PHQ9
SUM OF ALL RESPONSES TO PHQ QUESTIONS 1-9: 0
SUM OF ALL RESPONSES TO PHQ9 QUESTIONS 1 & 2: 0
SUM OF ALL RESPONSES TO PHQ QUESTIONS 1-9: 0
2. FEELING DOWN, DEPRESSED OR HOPELESS: 0
1. LITTLE INTEREST OR PLEASURE IN DOING THINGS: 0
SUM OF ALL RESPONSES TO PHQ QUESTIONS 1-9: 0

## 2020-10-26 ASSESSMENT — ENCOUNTER SYMPTOMS
BLOOD IN STOOL: 0
COUGH: 0
CONSTIPATION: 0
EYES NEGATIVE: 1
DIARRHEA: 0
ABDOMINAL PAIN: 0
SHORTNESS OF BREATH: 0
ALLERGIC/IMMUNOLOGIC NEGATIVE: 1

## 2020-10-26 NOTE — PROGRESS NOTES
[facility]  [facility]  [address]  [facility] [address]  Dept: [phone]    10/26/2020    CHIEF COMPLAINT    Chief Complaint   Patient presents with    Annual Exam       HPI    Anushka Armenta is a 56 y.o. female who presents   Chief Complaint   Patient presents with    Annual Exam   .  Physical exam. No pap needed due to hysterectomy for benign reasons. Colonoscopy due next year. Mammogram currently due. Last year she had breast implants removed which has improved many conditions that she was experiencing especially dizziness, fogginess, aching in joints, fatigue. She is still experiences tinnitus especially in the right ear. Weight and energy are stable. She has not been as active this past year since having the breast implant surgery. Vitals:    10/26/20 0731   BP: 100/68   Pulse: 59   Temp: 97 °F (36.1 °C)   SpO2: 99%   Weight: 130 lb (59 kg)   Height: 5' 6\" (1.676 m)       REVIEW OF SYSTEMS    Review of Systems   Constitutional: Negative for fatigue, fever and unexpected weight change. HENT: Positive for tinnitus. Pressure in right ear   Eyes: Negative. Respiratory: Negative for cough and shortness of breath. Cardiovascular: Negative for chest pain and leg swelling. Gastrointestinal: Negative for abdominal pain, blood in stool, constipation and diarrhea. Endocrine: Negative. Genitourinary: Negative for frequency and urgency. Musculoskeletal: Negative. Skin: Negative. Allergic/Immunologic: Negative. Neurological: Negative for dizziness and headaches. Hematological: Negative. Psychiatric/Behavioral: Negative for sleep disturbance. The patient is not nervous/anxious.         PAST MEDICAL HISTORY    Past Medical History:   Diagnosis Date    Acid reflux     Cellulitis 08/2019    Colon polyp     Fracture of thumb     20's    Hypokalemia     Nasal bone fx-closed     as a child       FAMILY HISTORY Family History   Problem Relation Age of Onset    Heart Disease Mother         CABGx 3 , [place]   Roshni Easton Hypertension Mother    Roshni Easton Arthritis Mother     Dementia Mother     Osteoporosis Mother     Cancer Mother [de-identified]        vulvar     Cervical Cancer Maternal Grandmother     Cancer Paternal Grandfather         throat    Hypertension Brother     Heart Disease Brother     Coronary Art Dis Brother     Asthma Brother     Heart Failure Father     Colon Polyps Sister         precancerous    Colon Polyps Nephew     Colon Cancer Maternal Aunt     Rectal Cancer Maternal Aunt        SOCIAL HISTORY    Social History     Socioeconomic History    Marital status:      Spouse name: None    Number of children: 2    Years of education: None    Highest education level: None   Occupational History    Occupation: book keeper   Social Needs    Financial resource strain: None    Food insecurity     Worry: None     Inability: None    Transportation needs     Medical: None     Non-medical: None   Tobacco Use    Smoking status: Never Smoker    Smokeless tobacco: Never Used   Substance and Sexual Activity    Alcohol use: Yes     Comment: occ    Drug use: No    Sexual activity: Yes     Birth control/protection: Surgical     Comment: hyst   Lifestyle    Physical activity     Days per week: None     Minutes per session: None    Stress: None   Relationships    Social connections     Talks on phone: None     Gets together: None     Attends Buddhist service: None     Active member of club or organization: None     Attends meetings of clubs or organizations: None     Relationship status: None    Intimate partner violence     Fear of current or ex partner: None     Emotionally abused: None     Physically abused: None     Forced sexual activity: None   Other Topics Concern    None   Social History Narrative    None       SURGICAL HISTORY    Past Surgical History:   Procedure Laterality Date    BLADDER REMOVAL  1986 cysts removed x 2    BREAST ENHANCEMENT SURGERY Bilateral     1995 implants placed under muscle    BREAST REDUCTION SURGERY Bilateral     July 2019    BREAST SURGERY Left 08/15/2019    I&D left breast    BREAST SURGERY Left 8/15/2019    BREAST INCISION AND DRAINAGE performed by Norman Hodge MD at [facility] Bilateral 10/2017    [address] [place] [address], 1989    x 2    COLONOSCOPY  2015    negative    DILATION AND CURETTAGE OF UTERUS  1981    miscarriages    DILATION AND CURETTAGE OF UTERUS  1998    dsyplasia    HERNIA REPAIR  2010    HYSTERECTOMY, TOTAL ABDOMINAL  1998    RHINOPLASTY  1998    TONSILLECTOMY AND ADENOIDECTOMY      1 yo       CURRENT MEDICATIONS    Current Outpatient Medications   Medication Sig Dispense Refill    vitamin E 1000 units capsule Take 1,000 Units by mouth daily      clotrimazole-betamethasone (LOTRISONE) 1-0.05 % cream apply to affected area twice a day 30 g 5    cephALEXin (KEFLEX) 250 MG capsule take 1 capsule by mouth after INTERCOURSE as directed 30 capsule 3    valACYclovir (VALTREX) 1 g tablet Take 1 tablet by mouth as needed (1 tablet po Q12H prn for cold sores) 30 tablet 1    Omega-3 Fatty Acids (FISH OIL) 500 MG CAPS Take 500 mg by mouth 2 times daily      sodium chloride (OCEAN, BABY AYR) 0.65 % nasal spray 1 spray by Nasal route 2 times daily      Cholecalciferol (VITAMIN D) 2000 units TABS tablet Take 2,000 Units by mouth daily       Probiotic Product (PROBIOTIC-10 PO) Take 1 tablet by mouth daily       NONFORMULARY Acid ease prn      Potassium 99 MG TABS Take 1 tablet by mouth 2 times daily       metroNIDAZOLE (METROGEL) 0.75 % gel Apply topically 2 times daily Indications: apply to face for rosacea        No current facility-administered medications for this visit.         ALLERGIES    Allergies   Allergen Reactions    Macrodantin [Nitrofurantoin] Hives    Sulfa Antibiotics Hives and Other (See Comments)     Itchy throat PHYSICAL EXAM   Physical Exam  Vitals signs reviewed. Constitutional:       Appearance: She is well-developed. HENT:      Head: Normocephalic. Right Ear: There is impacted cerumen. Left Ear: Tympanic membrane normal. There is no impacted cerumen. Eyes:      Conjunctiva/sclera: Conjunctivae normal.      Pupils: Pupils are equal, round, and reactive to light. Neck:      Musculoskeletal: Normal range of motion and neck supple. Thyroid: No thyromegaly. Cardiovascular:      Rate and Rhythm: Normal rate and regular rhythm. Heart sounds: Normal heart sounds. No murmur. Pulmonary:      Effort: Pulmonary effort is normal.      Breath sounds: Normal breath sounds. No wheezing or rales. Chest:      Breasts:         Right: No inverted nipple, mass, nipple discharge, skin change or tenderness. Left: No inverted nipple, mass, nipple discharge, skin change or tenderness. Comments: Post reduction surgical scars bilateral breasts. Abdominal:      Palpations: Abdomen is soft. There is no mass. Tenderness: There is no abdominal tenderness. There is no guarding or rebound. Hernia: There is no hernia in the left inguinal area or right inguinal area. Genitourinary:     Exam position: Supine. Rectum: Normal.   Musculoskeletal: Normal range of motion. General: No tenderness or deformity. Lymphadenopathy:      Cervical: No cervical adenopathy. Upper Body:      Right upper body: No supraclavicular, axillary or pectoral adenopathy. Left upper body: No supraclavicular, axillary or pectoral adenopathy. Lower Body: No right inguinal adenopathy. No left inguinal adenopathy. Skin:     General: Skin is warm and dry. Neurological:      Mental Status: She is alert and oriented to person, place, and time. Psychiatric:         Mood and Affect: Mood normal.         Behavior: Behavior normal.         Thought Content:  Thought content normal. Judgment: Judgment normal.         Assessment/PLan  1. Physical exam, annual  Continue healthy habits.  - Comprehensive Metabolic Panel; Future  - Lipid Panel; Future    2. Encounter for screening mammogram for malignant neoplasm of breast    - MICKEY DIGITAL SCREEN W OR WO CAD BILATERAL; Future    3. Elevated cholesterol  Good HDL has been very high which is beneficial.  Continue healthy diet  - Lipid Panel; Future    4. Impacted cerumen of right ear  Irrigation of right ear per medical assistant with good results. 5. Gastroesophageal reflux disease without esophagitis  Continue as needed medication if needed    6. Screening for endocrine, metabolic and immunity disorder    - Comprehensive Metabolic Panel; Future    7. Polyp of colon, unspecified part of colon, unspecified type  She will schedule colonoscopy next year      Miguel Coronado received counseling on the following healthy behaviors: nutrition, exercise and medication adherence  Reviewed prior labs and health maintenance. Continue current medications, diet and exercise. Discussed use, benefit, and side effects of prescribed medications. Barriers to medication compliance addressed. Patient given educational materials - see patient instructions. All patient questions answered. Patient voiced understanding. Return in about 1 year (around 10/26/2021).         Electronically signed by Дмитрий Zimmerman MD on 10/26/20 at 7:41 AM EDT

## 2020-10-27 LAB
ALBUMIN SERPL-MCNC: 4.5 G/DL (ref 3.5–5.2)
ALBUMIN/GLOBULIN RATIO: 2.5 (ref 1–2.5)
ALP BLD-CCNC: 52 U/L (ref 35–104)
ALT SERPL-CCNC: 17 U/L (ref 5–33)
ANION GAP SERPL CALCULATED.3IONS-SCNC: 9 MMOL/L (ref 9–17)
AST SERPL-CCNC: 27 U/L
BILIRUB SERPL-MCNC: 0.78 MG/DL (ref 0.3–1.2)
BUN BLDV-MCNC: 15 MG/DL (ref 8–23)
BUN/CREAT BLD: ABNORMAL (ref 9–20)
CALCIUM SERPL-MCNC: 9.1 MG/DL (ref 8.6–10.4)
CHLORIDE BLD-SCNC: 103 MMOL/L (ref 98–107)
CHOLESTEROL/HDL RATIO: 2.7
CHOLESTEROL: 265 MG/DL
CO2: 27 MMOL/L (ref 20–31)
CREAT SERPL-MCNC: 0.79 MG/DL (ref 0.5–0.9)
GFR AFRICAN AMERICAN: >60 ML/MIN
GFR NON-AFRICAN AMERICAN: >60 ML/MIN
GFR SERPL CREATININE-BSD FRML MDRD: ABNORMAL ML/MIN/{1.73_M2}
GFR SERPL CREATININE-BSD FRML MDRD: ABNORMAL ML/MIN/{1.73_M2}
GLUCOSE BLD-MCNC: 98 MG/DL (ref 70–99)
HDLC SERPL-MCNC: 98 MG/DL
LDL CHOLESTEROL: 152 MG/DL (ref 0–130)
POTASSIUM SERPL-SCNC: 4.4 MMOL/L (ref 3.7–5.3)
SODIUM BLD-SCNC: 139 MMOL/L (ref 135–144)
TOTAL PROTEIN: 6.3 G/DL (ref 6.4–8.3)
TRIGL SERPL-MCNC: 74 MG/DL
VLDLC SERPL CALC-MCNC: ABNORMAL MG/DL (ref 1–30)

## 2020-12-18 ENCOUNTER — HOSPITAL ENCOUNTER (OUTPATIENT)
Dept: MAMMOGRAPHY | Age: 60
Discharge: HOME | End: 2020-12-20
Payer: COMMERCIAL

## 2020-12-18 PROCEDURE — 77063 BREAST TOMOSYNTHESIS BI: CPT

## 2021-04-01 ENCOUNTER — OFFICE VISIT (OUTPATIENT)
Dept: FAMILY MEDICINE CLINIC | Age: 61
End: 2021-04-01
Payer: COMMERCIAL

## 2021-04-01 ENCOUNTER — HOSPITAL ENCOUNTER (OUTPATIENT)
Age: 61
Setting detail: SPECIMEN
Discharge: HOME | End: 2021-04-01
Payer: COMMERCIAL

## 2021-04-01 VITALS
HEIGHT: 66 IN | RESPIRATION RATE: 16 BRPM | DIASTOLIC BLOOD PRESSURE: 72 MMHG | HEART RATE: 68 BPM | OXYGEN SATURATION: 99 % | BODY MASS INDEX: 20.76 KG/M2 | SYSTOLIC BLOOD PRESSURE: 108 MMHG | WEIGHT: 129.2 LBS | TEMPERATURE: 97 F

## 2021-04-01 DIAGNOSIS — Z13.0 SCREENING FOR ENDOCRINE, METABOLIC AND IMMUNITY DISORDER: ICD-10-CM

## 2021-04-01 DIAGNOSIS — E83.42 HYPOMAGNESEMIA: ICD-10-CM

## 2021-04-01 DIAGNOSIS — R42 ORTHOSTATIC LIGHTHEADEDNESS: ICD-10-CM

## 2021-04-01 DIAGNOSIS — E55.9 VITAMIN D DEFICIENCY: ICD-10-CM

## 2021-04-01 DIAGNOSIS — Z13.29 SCREENING FOR ENDOCRINE, METABOLIC AND IMMUNITY DISORDER: ICD-10-CM

## 2021-04-01 DIAGNOSIS — E87.6 HYPOKALEMIA: ICD-10-CM

## 2021-04-01 DIAGNOSIS — R53.82 CHRONIC FATIGUE: Primary | ICD-10-CM

## 2021-04-01 DIAGNOSIS — Z13.228 SCREENING FOR ENDOCRINE, METABOLIC AND IMMUNITY DISORDER: ICD-10-CM

## 2021-04-01 DIAGNOSIS — R53.82 CHRONIC FATIGUE: ICD-10-CM

## 2021-04-01 PROBLEM — R00.8 VENTRICULAR BIGEMINY SEEN ON CARDIAC MONITOR: Status: ACTIVE | Noted: 2021-04-01

## 2021-04-01 PROBLEM — E78.5 DYSLIPIDEMIA: Status: ACTIVE | Noted: 2021-04-01

## 2021-04-01 PROBLEM — D72.829 LEUKOCYTOSIS: Status: ACTIVE | Noted: 2019-08-08

## 2021-04-01 PROBLEM — F41.9 ANXIETY: Status: ACTIVE | Noted: 2021-04-01

## 2021-04-01 PROBLEM — J30.1 ALLERGIC RHINITIS DUE TO POLLEN: Status: ACTIVE | Noted: 2021-04-01

## 2021-04-01 PROBLEM — J32.0 CHRONIC MAXILLARY SINUSITIS: Status: ACTIVE | Noted: 2021-04-01

## 2021-04-01 PROBLEM — J32.2 CHRONIC ETHMOIDAL SINUSITIS: Status: ACTIVE | Noted: 2021-04-01

## 2021-04-01 PROBLEM — R00.2 PALPITATIONS: Status: ACTIVE | Noted: 2021-04-01

## 2021-04-01 PROCEDURE — 36415 COLL VENOUS BLD VENIPUNCTURE: CPT | Performed by: FAMILY MEDICINE

## 2021-04-01 PROCEDURE — G8420 CALC BMI NORM PARAMETERS: HCPCS | Performed by: FAMILY MEDICINE

## 2021-04-01 PROCEDURE — 99214 OFFICE O/P EST MOD 30 MIN: CPT | Performed by: FAMILY MEDICINE

## 2021-04-01 PROCEDURE — 3017F COLORECTAL CA SCREEN DOC REV: CPT | Performed by: FAMILY MEDICINE

## 2021-04-01 PROCEDURE — 1036F TOBACCO NON-USER: CPT | Performed by: FAMILY MEDICINE

## 2021-04-01 PROCEDURE — G8427 DOCREV CUR MEDS BY ELIG CLIN: HCPCS | Performed by: FAMILY MEDICINE

## 2021-04-01 RX ORDER — CETIRIZINE HYDROCHLORIDE 10 MG/1
10 TABLET ORAL DAILY
COMMUNITY

## 2021-04-01 ASSESSMENT — PATIENT HEALTH QUESTIONNAIRE - PHQ9
1. LITTLE INTEREST OR PLEASURE IN DOING THINGS: 0
2. FEELING DOWN, DEPRESSED OR HOPELESS: 0
SUM OF ALL RESPONSES TO PHQ QUESTIONS 1-9: 0

## 2021-04-01 ASSESSMENT — ENCOUNTER SYMPTOMS
BLOOD IN STOOL: 0
CONSTIPATION: 0
COUGH: 0
ABDOMINAL PAIN: 0
NAUSEA: 0
EYES NEGATIVE: 1
SHORTNESS OF BREATH: 0
ALLERGIC/IMMUNOLOGIC NEGATIVE: 1
DIARRHEA: 0

## 2021-04-01 NOTE — PROGRESS NOTES
[facility]  [facility]  [address]  [address]  Dept: [phone]    4/1/2021    CHIEF COMPLAINT    Chief Complaint   Patient presents with    Dizziness    Discuss Labs     would like labs drawn for vitamin d & magnesiu,    Fatigue       HPI    Chris Chi is a 56 y.o. female who presents   Chief Complaint   Patient presents with    Dizziness    Discuss Labs     would like labs drawn for vitamin d & magnesiu,    Fatigue   . Recheck symptoms of chronic fatigue and lightheadedness. She has a history of vertigo and was seen by Dr. Lana Grijalva, ENT. He referred her to dentist for TMJ symptoms. She is trying to wear a nightguard but it is uncomfortable. She is trying to exercise on a regular basis but does fatigue easily. She had breast implants removed 2 years ago and says symptoms that precipitated that decision have recurred but to a lesser extent. These included some achiness in joints and fatigue. Dizziness  This is a recurrent problem. The current episode started more than 1 year ago. The problem occurs intermittently. The problem has been waxing and waning. Associated symptoms include fatigue and vertigo. Pertinent negatives include no abdominal pain, chest pain, coughing, fever, headaches or nausea. The symptoms are aggravated by exertion. She has tried rest for the symptoms. The treatment provided mild relief. Vitals:    04/01/21 0804   BP: 108/72   Site: Left Upper Arm   Position: Sitting   Cuff Size: Medium Adult   Pulse: 68   Resp: 16   Temp: 97 °F (36.1 °C)   TempSrc: Temporal   SpO2: 99%   Weight: 129 lb 3.2 oz (58.6 kg)   Height: 5' 6\" (1.676 m)       REVIEW OF SYSTEMS    Review of Systems   Constitutional: Positive for fatigue. Negative for fever and unexpected weight change. HENT: Positive for tinnitus. Saw ENT, ears normal, possible TMJ.  is uncomfortable. Eyes: Negative.          See ophthalmologist, possible early macular degeneration   Respiratory: Negative for cough and shortness of breath. Cardiovascular: Positive for palpitations (improved). Negative for chest pain and leg swelling. Gastrointestinal: Negative for abdominal pain, blood in stool, constipation, diarrhea and nausea. Hx of ibs with diarrhea   Endocrine: Negative. Genitourinary: Negative for frequency and urgency. Musculoskeletal: Negative. Skin: Negative. One thumbnail distorted   Allergic/Immunologic: Negative. Neurological: Positive for vertigo and light-headedness. Negative for dizziness and headaches. Hematological: Negative. Psychiatric/Behavioral: Positive for sleep disturbance (interupted, can't return to sleep). The patient is not nervous/anxious.         PAST MEDICAL HISTORY    Past Medical History:   Diagnosis Date    Acid reflux     Cellulitis 08/2019    Colon polyp     Fracture of thumb     20's    Hypokalemia     Nasal bone fx-closed     as a child       FAMILY HISTORY    Family History   Problem Relation Age of Onset    Heart Disease Mother         CABGx 3 , [place]   Larance Woodson Hypertension Mother    Larance Patito Arthritis Mother     Dementia Mother     Osteoporosis Mother     Cancer Mother [de-identified]        vulvar     Cervical Cancer Maternal Grandmother     Cancer Paternal Grandfather         throat    Hypertension Brother     Heart Disease Brother     Coronary Art Dis Brother     Asthma Brother     Heart Failure Father     Colon Polyps Sister         precancerous    Colon Polyps Nephew     Colon Cancer Maternal Aunt     Rectal Cancer Maternal Aunt        SOCIAL HISTORY    Social History     Socioeconomic History    Marital status:      Spouse name: None    Number of children: 2    Years of education: None    Highest education level: None   Occupational History    Occupation: book keeper   Social Needs    Financial resource strain: None    Food insecurity     Worry: None Inability: None    Transportation needs     Medical: None     Non-medical: None   Tobacco Use    Smoking status: Never Smoker    Smokeless tobacco: Never Used   Substance and Sexual Activity    Alcohol use: Yes     Comment: occ    Drug use: No    Sexual activity: Yes     Birth control/protection: Surgical     Comment: hyst   Lifestyle    Physical activity     Days per week: None     Minutes per session: None    Stress: None   Relationships    Social connections     Talks on phone: None     Gets together: None     Attends Orthodox service: None     Active member of club or organization: None     Attends meetings of clubs or organizations: None     Relationship status: None    Intimate partner violence     Fear of current or ex partner: None     Emotionally abused: None     Physically abused: None     Forced sexual activity: None   Other Topics Concern    None   Social History Narrative    None       SURGICAL HISTORY    Past Surgical History:   Procedure Laterality Date    BLADDER REMOVAL  1986     cysts removed x 2    BREAST ENHANCEMENT SURGERY Bilateral     1995 implants placed under muscle    BREAST REDUCTION SURGERY Bilateral     July 2019    BREAST SURGERY Left 08/15/2019    I&D left breast    BREAST SURGERY Left 8/15/2019    BREAST INCISION AND DRAINAGE performed by [facility]ia [facility] at [facility] Bilateral 10/2017    [address], 1989    x 2    COLONOSCOPY  2015    negative    DILATION AND CURETTAGE OF UTERUS  1981    miscarriages    DILATION AND CURETTAGE OF UTERUS  1998    dsyplasia    HERNIA REPAIR  2010    HYSTERECTOMY, TOTAL ABDOMINAL  1998    RHINOPLASTY  1998    TONSILLECTOMY AND ADENOIDECTOMY      1 yo       CURRENT MEDICATIONS    Current Outpatient Medications   Medication Sig Dispense Refill    cetirizine (ZYRTEC) 10 MG tablet Take 10 mg by mouth daily      clotrimazole-betamethasone (LOTRISONE) 1-0.05 % cream apply to affected area twice a day Psychiatric:         Mood and Affect: Mood normal.         Behavior: Behavior normal.         Thought Content: Thought content normal.         Judgment: Judgment normal.         ASSESSMENT/PLAN  1. Chronic fatigue  Check labs. Continue supplements unless indicated otherwise by results. Continue to try and follow healthy diet and exercise as tolerated  - CBC Auto Differential; Future  - Basic Metabolic Panel; Future  - Magnesium; Future  - Vitamin B12; Future  - Vitamin D 25 Hydroxy; Future  - T4, Free; Future  - TSH without Reflex; Future    2. Orthostatic lightheadedness  Continue hydration and electrolyte solution if active  - CBC Auto Differential; Future  - Basic Metabolic Panel; Future    3. Hypokalemia  Continue supplement unless indicated otherwise  - Basic Metabolic Panel; Future    4. Hypomagnesemia  As above  - Magnesium; Future    5. Screening for endocrine, metabolic and immunity disorder    - CBC Auto Differential; Future  - Basic Metabolic Panel; Future    6. Vitamin D deficiency  Adjust supplement if needed  - Vitamin D 25 Hydroxy; Future       Amparo Freed received counseling on the following healthy behaviors: nutrition, exercise and medication adherence  Reviewed prior labs and health maintenance. Continue current medications, diet and exercise. Discussed use, benefit, and side effects of prescribed medications. Barriers to medication compliance addressed. Patient given educational materials - see patient instructions. All patient questions answered. Patient voiced understanding. Return if symptoms worsen or fail to improve.         Electronically signed by Ad Tavares MD on 4/1/21 at 8:08 AM EDT

## 2021-04-01 NOTE — PROGRESS NOTES
Depression screening done  Chief Complaint   Patient presents with    Dizziness    Discuss Labs     would like labs drawn for vitamin d & magnesiu,

## 2021-04-02 LAB
ABSOLUTE EOS #: 0.06 K/UL (ref 0–0.44)
ABSOLUTE IMMATURE GRANULOCYTE: <0.03 K/UL (ref 0–0.3)
ABSOLUTE LYMPH #: 2.09 K/UL (ref 1.1–3.7)
ABSOLUTE MONO #: 0.29 K/UL (ref 0.1–1.2)
ANION GAP SERPL CALCULATED.3IONS-SCNC: 12 MMOL/L (ref 9–17)
BASOPHILS # BLD: 1 % (ref 0–2)
BASOPHILS ABSOLUTE: 0.04 K/UL (ref 0–0.2)
BUN BLDV-MCNC: 13 MG/DL (ref 8–23)
BUN/CREAT BLD: NORMAL (ref 9–20)
CALCIUM SERPL-MCNC: 9.1 MG/DL (ref 8.6–10.4)
CHLORIDE BLD-SCNC: 105 MMOL/L (ref 98–107)
CO2: 25 MMOL/L (ref 20–31)
CREAT SERPL-MCNC: 0.68 MG/DL (ref 0.5–0.9)
DIFFERENTIAL TYPE: NORMAL
EOSINOPHILS RELATIVE PERCENT: 1 % (ref 1–4)
GFR AFRICAN AMERICAN: >60 ML/MIN
GFR NON-AFRICAN AMERICAN: >60 ML/MIN
GFR SERPL CREATININE-BSD FRML MDRD: NORMAL ML/MIN/{1.73_M2}
GFR SERPL CREATININE-BSD FRML MDRD: NORMAL ML/MIN/{1.73_M2}
GLUCOSE BLD-MCNC: 92 MG/DL (ref 70–99)
HCT VFR BLD CALC: 43.8 % (ref 36.3–47.1)
HEMOGLOBIN: 14.4 G/DL (ref 11.9–15.1)
IMMATURE GRANULOCYTES: 0 %
LYMPHOCYTES # BLD: 38 % (ref 24–43)
MAGNESIUM: 2.1 MG/DL (ref 1.6–2.6)
MCH RBC QN AUTO: 31.3 PG (ref 25.2–33.5)
MCHC RBC AUTO-ENTMCNC: 32.9 G/DL (ref 28.4–34.8)
MCV RBC AUTO: 95.2 FL (ref 82.6–102.9)
MONOCYTES # BLD: 5 % (ref 3–12)
NRBC AUTOMATED: 0 PER 100 WBC
PDW BLD-RTO: 13.2 % (ref 11.8–14.4)
PLATELET # BLD: 270 K/UL (ref 138–453)
PLATELET ESTIMATE: NORMAL
PMV BLD AUTO: 10.6 FL (ref 8.1–13.5)
POTASSIUM SERPL-SCNC: 4.9 MMOL/L (ref 3.7–5.3)
RBC # BLD: 4.6 M/UL (ref 3.95–5.11)
RBC # BLD: NORMAL 10*6/UL
SEG NEUTROPHILS: 55 % (ref 36–65)
SEGMENTED NEUTROPHILS ABSOLUTE COUNT: 3.07 K/UL (ref 1.5–8.1)
SODIUM BLD-SCNC: 142 MMOL/L (ref 135–144)
THYROXINE, FREE: 1.47 NG/DL (ref 0.93–1.7)
TSH SERPL DL<=0.05 MIU/L-ACNC: 2.32 MIU/L (ref 0.3–5)
VITAMIN B-12: 710 PG/ML (ref 232–1245)
VITAMIN D 25-HYDROXY: 39.4 NG/ML (ref 30–100)
WBC # BLD: 5.6 K/UL (ref 3.5–11.3)
WBC # BLD: NORMAL 10*3/UL

## 2021-11-19 ENCOUNTER — TELEPHONE (OUTPATIENT)
Dept: FAMILY MEDICINE CLINIC | Age: 61
End: 2021-11-19

## 2021-11-19 NOTE — TELEPHONE ENCOUNTER
----- Message from Pelon Xiong sent at 11/19/2021 10:06 AM EST -----  Subject: Appointment Request    Reason for Call: Routine Physical Exam    QUESTIONS  Type of Appointment? Established Patient  Reason for appointment request? Available appointments did not meet   patient need  Additional Information for Provider? Pt would like to set up physical   appt. Only available appt was for today 11/19 at 10:15 please contact pt   before the end of office hrs today to schedule appt.   ---------------------------------------------------------------------------  --------------  CALL BACK INFO  What is the best way for the office to contact you? OK to leave message on   voicemail  Preferred Call Back Phone Number? [phone]  ---------------------------------------------------------------------------  --------------  SCRIPT ANSWERS  Relationship to Patient? Self  (If the patient has Medicare as their primary insurance coverage ask this   question) Are you requesting a Medicare Annual Wellness Visit? No  (Is the patient requesting a pap smear with their physical exam?)? No  (Is the patient requesting their annual physical and does not need PAP or   AWV per above?)? Yes   Have you been diagnosed with, awaiting test results for, or told that you   are suspected of having COVID-19 (Coronavirus)? (If patient has tested   negative or was tested as a requirement for work, school, or travel and   not based on symptoms, answer no)? No  Within the past two weeks have you developed any of the following symptoms   (answer no if symptoms have been present longer than 2 weeks or began   more than 2 weeks ago)? Fever or Chills, Cough, Shortness of breath or   difficulty breathing, Loss of taste or smell, Sore throat, Nasal   congestion, Sneezing or runny nose, Fatigue or generalized body aches   (answer no if pain is specific to a body part e.g. back pain), Diarrhea,   Headache?  No  Have you had close contact with someone with COVID-19 in the last 14 days? No  (Service Expert  click yes below to proceed with [employer] As Usual   Scheduling)?  Yes

## 2021-12-10 ENCOUNTER — OFFICE VISIT (OUTPATIENT)
Dept: FAMILY MEDICINE CLINIC | Age: 61
End: 2021-12-10
Payer: COMMERCIAL

## 2021-12-10 ENCOUNTER — HOSPITAL ENCOUNTER (OUTPATIENT)
Age: 61
Setting detail: SPECIMEN
Discharge: HOME | End: 2021-12-10

## 2021-12-10 VITALS
TEMPERATURE: 97.1 F | DIASTOLIC BLOOD PRESSURE: 72 MMHG | SYSTOLIC BLOOD PRESSURE: 120 MMHG | OXYGEN SATURATION: 99 % | WEIGHT: 126.6 LBS | HEART RATE: 68 BPM | BODY MASS INDEX: 20.43 KG/M2

## 2021-12-10 DIAGNOSIS — E87.6 HYPOKALEMIA: ICD-10-CM

## 2021-12-10 DIAGNOSIS — Z12.31 ENCOUNTER FOR SCREENING MAMMOGRAM FOR MALIGNANT NEOPLASM OF BREAST: ICD-10-CM

## 2021-12-10 DIAGNOSIS — E78.5 DYSLIPIDEMIA: ICD-10-CM

## 2021-12-10 DIAGNOSIS — G89.29 CHRONIC MIDLINE LOW BACK PAIN WITHOUT SCIATICA: ICD-10-CM

## 2021-12-10 DIAGNOSIS — Z00.00 ANNUAL PHYSICAL EXAM: Primary | ICD-10-CM

## 2021-12-10 DIAGNOSIS — I49.3 PVC'S (PREMATURE VENTRICULAR CONTRACTIONS): ICD-10-CM

## 2021-12-10 DIAGNOSIS — E83.42 HYPOMAGNESEMIA: ICD-10-CM

## 2021-12-10 DIAGNOSIS — F41.9 ANXIETY: ICD-10-CM

## 2021-12-10 DIAGNOSIS — R00.2 PALPITATIONS: ICD-10-CM

## 2021-12-10 DIAGNOSIS — M54.50 CHRONIC MIDLINE LOW BACK PAIN WITHOUT SCIATICA: ICD-10-CM

## 2021-12-10 DIAGNOSIS — E55.9 VITAMIN D DEFICIENCY: ICD-10-CM

## 2021-12-10 PROCEDURE — G8484 FLU IMMUNIZE NO ADMIN: HCPCS | Performed by: FAMILY MEDICINE

## 2021-12-10 PROCEDURE — 99396 PREV VISIT EST AGE 40-64: CPT | Performed by: FAMILY MEDICINE

## 2021-12-10 PROCEDURE — 36415 COLL VENOUS BLD VENIPUNCTURE: CPT | Performed by: FAMILY MEDICINE

## 2021-12-10 RX ORDER — DIAZEPAM 2 MG/1
2 TABLET ORAL EVERY 8 HOURS PRN
Qty: 30 TABLET | Refills: 0 | Status: SHIPPED | OUTPATIENT
Start: 2021-12-10 | End: 2021-12-20

## 2021-12-10 ASSESSMENT — ENCOUNTER SYMPTOMS
ABDOMINAL PAIN: 0
BLOOD IN STOOL: 0
DIARRHEA: 0
EYES NEGATIVE: 1
ALLERGIC/IMMUNOLOGIC NEGATIVE: 1
COUGH: 0
BACK PAIN: 1
SHORTNESS OF BREATH: 0
CONSTIPATION: 0

## 2021-12-10 NOTE — PROGRESS NOTES
[facility]  [facility]  [address] [place] [id number]  Dept: [phone]    12/10/2021    CHIEF COMPLAINT    Chief Complaint   Patient presents with    Annual Exam       HPI    Jayden Herring is a 59 y.o. female who presents   Chief Complaint   Patient presents with    Annual Exam   .  Annual physical exam.  Has been evaluated by cardiologist for chronic palpitations related to PVCs. He had suggested that she take metoprolol but she has not started it due to concern about it lowering her blood pressure and heart rate too much. She does feel quite anxious when she gets the palpitations. She has tried Xanax in the past but would like to try low-dose of Valium. Exercising regularly. Weight is stable. Up-to-date on mammogram.      Vitals:    12/10/21 1340   BP: 120/72   Pulse: 68   Temp: 97.1 °F (36.2 °C)   SpO2: 99%   Weight: 126 lb 9.6 oz (57.4 kg)       REVIEW OF SYSTEMS    Review of Systems   Constitutional: Negative for fatigue, fever and unexpected weight change. HENT: Negative. Eyes: Negative. Respiratory: Negative for cough and shortness of breath. Cardiovascular: Negative for chest pain and leg swelling. Gastrointestinal: Negative for abdominal pain, blood in stool, constipation and diarrhea. Endocrine: Negative. Genitourinary: Negative for frequency and urgency. Musculoskeletal: Positive for back pain (  Intermittent, related to activity level). Skin: Negative. Allergic/Immunologic: Negative. Neurological: Negative for dizziness and headaches. Hematological: Negative. Psychiatric/Behavioral: Negative for sleep disturbance. The patient is not nervous/anxious.         PAST MEDICAL HISTORY    Past Medical History:   Diagnosis Date    Acid reflux     Cellulitis 08/2019    Colon polyp     Fracture of thumb     20's    Hypokalemia     Nasal bone fx-closed     as a child       FAMILY HISTORY    Family History   Problem Relation Age of Onset    Heart Disease Mother         CABGx 3 , MI   [place] Hypertension Mother    [place] Arthritis Mother     Dementia Mother     Osteoporosis Mother     Cancer Mother [de-identified]        vulvar     Cervical Cancer Maternal Grandmother     Cancer Paternal Grandfather         throat    Hypertension Brother     Heart Disease Brother     Coronary Art Dis Brother     Asthma Brother     Heart Failure Father     Colon Polyps Sister         precancerous    Colon Polyps Nephew     Colon Cancer Maternal Aunt     Rectal Cancer Maternal Aunt        SOCIAL HISTORY    Social History     Socioeconomic History    Marital status:      Spouse name: None    Number of children: 2    Years of education: None    Highest education level: None   Occupational History    Occupation: book keeper   Tobacco Use    Smoking status: Never Smoker    Smokeless tobacco: Never Used   Substance and Sexual Activity    Alcohol use: Yes     Comment: occ    Drug use: No    Sexual activity: Yes     Birth control/protection: Surgical     Comment: hyst   Other Topics Concern    None   Social History Narrative    None     Social Determinants of Health     Financial Resource Strain:     Difficulty of Paying Living Expenses: Not on file   Food Insecurity:     Worried About Running Out of Food in the Last Year: Not on file    Jelena of Food in the Last Year: Not on file   Transportation Needs:     Lack of Transportation (Medical): Not on file    Lack of Transportation (Non-Medical):  Not on file   Physical Activity:     Days of Exercise per Week: Not on file    Minutes of Exercise per Session: Not on file   Stress:     Feeling of Stress : Not on file   Social Connections:     Frequency of Communication with Friends and Family: Not on file    Frequency of Social Gatherings with Friends and Family: Not on file    Attends Gnosticism Services: Not on file    Active Member of Clubs or Organizations: Not on file    Attends Club or Organization Meetings: Not on file    Marital Status: Not on file   Intimate Partner Violence:     Fear of Current or Ex-Partner: Not on file    Emotionally Abused: Not on file    Physically Abused: Not on file    Sexually Abused: Not on file   Housing Stability:     Unable to Pay for Housing in the Last Year: Not on file    Number of Skip in the Last Year: Not on file    Unstable Housing in the Last Year: Not on file       SURGICAL HISTORY    Past Surgical History:   Procedure Laterality Date   [address]     cysts removed x 2    BREAST ENHANCEMENT SURGERY Bilateral      implants placed under muscle    BREAST REDUCTION SURGERY Bilateral     2019    BREAST SURGERY Left 08/15/2019    I&D left breast    BREAST SURGERY Left 8/15/2019    BREAST INCISION AND DRAINAGE performed by Marylou Farrell MD at [email or web address] Bilateral 10/2017     SECTION  , 1989    x 2    COLONOSCOPY  2015    negative    DILATION AND CURETTAGE OF UTERUS  1981    miscarriages    DILATION AND CURETTAGE OF UTERUS      dsyplasia    HERNIA REPAIR  2010    HYSTERECTOMY, TOTAL ABDOMINAL  1998    RHINOPLASTY  1998    TONSILLECTOMY AND ADENOIDECTOMY      1 yo       CURRENT MEDICATIONS    Current Outpatient Medications   Medication Sig Dispense Refill    diazePAM (VALIUM) 2 MG tablet Take 1 tablet by mouth every 8 hours as needed for Anxiety for up to 10 days.  30 tablet 0    valACYclovir (VALTREX) 1 g tablet Take 1 tablet by mouth as needed (1 tablet po Q12H prn for cold sores) 30 tablet 1    Omega-3 Fatty Acids (FISH OIL) 500 MG CAPS Take 500 mg by mouth 2 times daily      sodium chloride (OCEAN, BABY AYR) 0.65 % nasal spray 1 spray by Nasal route 2 times daily      Probiotic Product (PROBIOTIC-10 PO) Take 1 tablet by mouth daily       NONFORMULARY Acid ease prn      Potassium 99 MG TABS Take 1 tablet by mouth 2 times daily       cetirizine (ZYRTEC) 10 MG tablet Take 10 mg by mouth daily (Patient not taking: Reported on 12/10/2021)      Cholecalciferol (VITAMIN D) 2000 units TABS tablet Take 2,000 Units by mouth daily  (Patient not taking: Reported on 12/10/2021)       No current facility-administered medications for this visit. ALLERGIES    Allergies   Allergen Reactions    Macrodantin [Nitrofurantoin] Hives    Sulfa Antibiotics Hives and Other (See Comments)     Itchy throat       PHYSICAL EXAM   Physical Exam  Vitals reviewed. Constitutional:       Appearance: She is well-developed. HENT:      Head: Normocephalic. Eyes:      Conjunctiva/sclera: Conjunctivae normal.      Pupils: Pupils are equal, round, and reactive to light. Neck:      Thyroid: No thyromegaly. Cardiovascular:      Rate and Rhythm: Normal rate and regular rhythm. Heart sounds: Normal heart sounds. No murmur heard. Pulmonary:      Effort: Pulmonary effort is normal.      Breath sounds: Normal breath sounds. No wheezing or rales. Chest:   Breasts:      Right: No inverted nipple, mass, nipple discharge, skin change, tenderness, axillary adenopathy or supraclavicular adenopathy. Left: No inverted nipple, mass, nipple discharge, skin change, tenderness, axillary adenopathy or supraclavicular adenopathy. Comments: Bilateral breast reduction surgery scars around nipples. Abdominal:      Palpations: Abdomen is soft. Tenderness: There is no abdominal tenderness. There is no guarding or rebound. Musculoskeletal:         General: No tenderness or deformity. Normal range of motion. Cervical back: Normal range of motion and neck supple. Lymphadenopathy:      Cervical: No cervical adenopathy. Upper Body:      Right upper body: No supraclavicular, axillary or pectoral adenopathy. Left upper body: No supraclavicular, axillary or pectoral adenopathy. Skin:     General: Skin is warm and dry.    Neurological:      Mental Status: She is alert and oriented to person, place, and time. Psychiatric:         Mood and Affect: Mood normal.         Behavior: Behavior normal.         Thought Content: Thought content normal.         Judgment: Judgment normal.         ASSESSMENT/PLAN  1. Annual physical exam  Continue healthy habits. 2. Palpitations  Trial of diazepam if palpitations occur. Discussed that it can cause drowsiness and she should take it at home when she first tries it.  - diazePAM (VALIUM) 2 MG tablet; Take 1 tablet by mouth every 8 hours as needed for Anxiety for up to 10 days. Dispense: 30 tablet; Refill: 0    3. Anxiety  As above  - diazePAM (VALIUM) 2 MG tablet; Take 1 tablet by mouth every 8 hours as needed for Anxiety for up to 10 days. Dispense: 30 tablet; Refill: 0    4. Hypokalemia  Recheck labs and continue supplement. Adjust if indicated  - Comprehensive Metabolic Panel; Future    5. Hypomagnesemia  Adjust supplement if indicated  - Magnesium; Future    6. Vitamin D deficiency  Adjust supplement if indicated  - Vitamin D 25 Hydroxy; Future    7. Dyslipidemia  Continue following healthy diet  - Lipid Panel; Future    8. PVC's (premature ventricular contractions)  Follow with cardiologist  - diazePAM (VALIUM) 2 MG tablet; Take 1 tablet by mouth every 8 hours as needed for Anxiety for up to 10 days. Dispense: 30 tablet; Refill: 0  - Comprehensive Metabolic Panel; Future    9. Chronic midline low back pain without sciatica  Use OTC med if needed. Continue activity within any limitations    10. Encounter for screening mammogram for malignant neoplasm of breast    - [facility] DIGITAL SCREEN W OR WO CAD BILATERAL; Future       Aleksandra Proper received counseling on the following healthy behaviors: nutrition, exercise and medication adherence  Reviewed prior labs and health maintenance. Continue current medications, diet and exercise. Discussed use, benefit, and side effects of prescribed medications.  Barriers to medication compliance addressed. Patient given educational materials - see patient instructions. All patient questions answered. Patient voiced understanding. Return in about 1 year (around 12/10/2022), or if symptoms worsen or fail to improve.         Electronically signed by Phoebe Cui MD on 12/10/21 at 1:48 PM EST

## 2021-12-11 DIAGNOSIS — E55.9 VITAMIN D DEFICIENCY: ICD-10-CM

## 2021-12-11 DIAGNOSIS — E78.5 DYSLIPIDEMIA: ICD-10-CM

## 2021-12-11 DIAGNOSIS — I49.3 PVC'S (PREMATURE VENTRICULAR CONTRACTIONS): ICD-10-CM

## 2021-12-11 DIAGNOSIS — E87.6 HYPOKALEMIA: ICD-10-CM

## 2021-12-11 DIAGNOSIS — E83.42 HYPOMAGNESEMIA: ICD-10-CM

## 2021-12-11 LAB
ALBUMIN SERPL-MCNC: 4.8 G/DL (ref 3.5–5.2)
ALBUMIN/GLOBULIN RATIO: 2.8 (ref 1–2.5)
ALP BLD-CCNC: 62 U/L (ref 35–104)
ALT SERPL-CCNC: 18 U/L (ref 5–33)
ANION GAP SERPL CALCULATED.3IONS-SCNC: 17 MMOL/L (ref 9–17)
AST SERPL-CCNC: 25 U/L
BILIRUB SERPL-MCNC: 0.71 MG/DL (ref 0.3–1.2)
BUN BLDV-MCNC: 14 MG/DL (ref 8–23)
BUN/CREAT BLD: ABNORMAL (ref 9–20)
CALCIUM SERPL-MCNC: 9.1 MG/DL (ref 8.6–10.4)
CHLORIDE BLD-SCNC: 98 MMOL/L (ref 98–107)
CHOLESTEROL/HDL RATIO: 2.2
CHOLESTEROL: 218 MG/DL
CO2: 23 MMOL/L (ref 20–31)
CREAT SERPL-MCNC: 0.82 MG/DL (ref 0.5–0.9)
GFR AFRICAN AMERICAN: >60 ML/MIN
GFR NON-AFRICAN AMERICAN: >60 ML/MIN
GFR SERPL CREATININE-BSD FRML MDRD: ABNORMAL ML/MIN/{1.73_M2}
GFR SERPL CREATININE-BSD FRML MDRD: ABNORMAL ML/MIN/{1.73_M2}
GLUCOSE BLD-MCNC: 84 MG/DL (ref 70–99)
HDLC SERPL-MCNC: 97 MG/DL
LDL CHOLESTEROL: 112 MG/DL (ref 0–130)
MAGNESIUM: 2.2 MG/DL (ref 1.6–2.6)
POTASSIUM SERPL-SCNC: 4.7 MMOL/L (ref 3.7–5.3)
SODIUM BLD-SCNC: 138 MMOL/L (ref 135–144)
TOTAL PROTEIN: 6.5 G/DL (ref 6.4–8.3)
TRIGL SERPL-MCNC: 47 MG/DL
VITAMIN D 25-HYDROXY: 30.9 NG/ML (ref 30–100)
VLDLC SERPL CALC-MCNC: ABNORMAL MG/DL (ref 1–30)

## 2022-10-03 ENCOUNTER — APPOINTMENT (RX ONLY)
Dept: URBAN - METROPOLITAN AREA CLINIC 98 | Facility: CLINIC | Age: 62
Setting detail: DERMATOLOGY
End: 2022-10-03

## 2022-10-03 DIAGNOSIS — L0390 CELLULITIS AND ABSCESS OF UNSPECIFIED SITES: ICD-10-CM

## 2022-10-03 DIAGNOSIS — R21 RASH AND OTHER NONSPECIFIC SKIN ERUPTION: ICD-10-CM

## 2022-10-03 DIAGNOSIS — L0391 CELLULITIS AND ABSCESS OF UNSPECIFIED SITES: ICD-10-CM

## 2022-10-03 PROBLEM — L02.91 CUTANEOUS ABSCESS, UNSPECIFIED: Status: ACTIVE | Noted: 2022-10-03

## 2022-10-03 PROCEDURE — ? PRESCRIPTION

## 2022-10-03 PROCEDURE — 99203 OFFICE O/P NEW LOW 30 MIN: CPT

## 2022-10-03 PROCEDURE — ? COUNSELING

## 2022-10-05 RX ORDER — CEPHALEXIN 500 MG/1
TABLET ORAL QID
Qty: 20 | Refills: 0 | Status: ERX

## 2022-10-05 RX ORDER — TRIAMCINOLONE ACETONIDE 1 MG/G
CREAM TOPICAL BID
Qty: 453.6 | Refills: 0 | Status: ERX

## 2022-11-01 ENCOUNTER — APPOINTMENT (RX ONLY)
Dept: URBAN - METROPOLITAN AREA CLINIC 98 | Facility: CLINIC | Age: 62
Setting detail: DERMATOLOGY
End: 2022-11-01

## 2022-11-01 DIAGNOSIS — L85.3 XEROSIS CUTIS: ICD-10-CM

## 2022-11-01 DIAGNOSIS — L0390 CELLULITIS AND ABSCESS OF UNSPECIFIED SITES: ICD-10-CM

## 2022-11-01 DIAGNOSIS — L0391 CELLULITIS AND ABSCESS OF UNSPECIFIED SITES: ICD-10-CM

## 2022-11-01 DIAGNOSIS — R21 RASH AND OTHER NONSPECIFIC SKIN ERUPTION: ICD-10-CM

## 2022-11-01 PROBLEM — L02.91 CUTANEOUS ABSCESS, UNSPECIFIED: Status: ACTIVE | Noted: 2022-11-01

## 2022-11-01 PROCEDURE — ? PHOTO-DOCUMENTATION

## 2022-11-01 PROCEDURE — 99213 OFFICE O/P EST LOW 20 MIN: CPT

## 2022-11-01 PROCEDURE — ? TREATMENT REGIMEN

## 2022-11-01 PROCEDURE — ? PRESCRIPTION

## 2022-11-01 PROCEDURE — ? COUNSELING

## 2022-11-01 RX ORDER — LEVOCETIRIZINE DIHYDROCHLORIDE 5 MG/1
TABLET, FILM COATED ORAL
Qty: 30 | Refills: 0 | Status: ERX

## 2022-11-01 NOTE — PROCEDURE: TREATMENT REGIMEN
Discontinue Regimen: Cephalexin 500 mg tab by mouth twice a day for 10 days
Detail Level: Zone
Continue Regimen: triamcinolone acetonide 0.1 % topical cream TP\\nSig: Apply twice daily to rash BID X 2 wks, 2 wks off. Repeat as needed.
Initiate Treatment: levocetirizine 5 mg tablet \\nSig: Take one pill every day with a glass of water for 30 days.

## 2022-12-07 ENCOUNTER — APPOINTMENT (RX ONLY)
Dept: URBAN - METROPOLITAN AREA CLINIC 98 | Facility: CLINIC | Age: 62
Setting detail: DERMATOLOGY
End: 2022-12-07

## 2022-12-07 DIAGNOSIS — D485 NEOPLASM OF UNCERTAIN BEHAVIOR OF SKIN: ICD-10-CM

## 2022-12-07 PROBLEM — D48.5 NEOPLASM OF UNCERTAIN BEHAVIOR OF SKIN: Status: ACTIVE | Noted: 2022-12-07

## 2022-12-07 PROCEDURE — 11105 PUNCH BX SKIN EA SEP/ADDL: CPT

## 2022-12-07 PROCEDURE — ? BIOPSY BY PUNCH METHOD

## 2022-12-07 PROCEDURE — ? COUNSELING

## 2022-12-07 PROCEDURE — 11104 PUNCH BX SKIN SINGLE LESION: CPT

## 2022-12-07 PROCEDURE — ? PHOTO-DOCUMENTATION

## 2022-12-07 PROCEDURE — ? ADDITIONAL NOTES

## 2022-12-07 ASSESSMENT — LOCATION DETAILED DESCRIPTION DERM
LOCATION DETAILED: RIGHT SUPERIOR UPPER BACK
LOCATION DETAILED: LEFT INFERIOR UPPER BACK

## 2022-12-07 ASSESSMENT — LOCATION SIMPLE DESCRIPTION DERM
LOCATION SIMPLE: LEFT UPPER BACK
LOCATION SIMPLE: RIGHT UPPER BACK

## 2022-12-07 ASSESSMENT — LOCATION ZONE DERM: LOCATION ZONE: TRUNK

## 2022-12-07 NOTE — PROCEDURE: BIOPSY BY PUNCH METHOD
Body Location Override (Optional - Billing Will Still Be Based On Selected Body Map Location If Applicable): LEFT LATERAL MID BACK
Detail Level: Detailed
Was A Bandage Applied: Yes
Punch Size In Mm: 3
Size Of Lesion In Cm (Optional): 0
Depth Of Punch Biopsy: dermis
Biopsy Type: H and E
Anesthesia Type: 1% lidocaine with epinephrine
Anesthesia Volume In Cc (Will Not Render If 0): 0.5
Hemostasis: None
Epidermal Sutures: 4-0 Prolene
Wound Care: Petrolatum
Dressing: bandage
Suture Removal: 14 days
Patient Will Remove Sutures At Home?: No
Path Notes (To The Dermatopathologist): Size: 3.0 mm\\nR/o: Prurigo Nodularis vs Bullous Pemphigoid \\nAdd PAS (Add colloidal iron stain)\\n(Patient clinically shows signs and symptoms of neurotic excoriation)
Consent: Written consent was obtained and risks were reviewed including but not limited to scarring, infection, bleeding, scabbing, incomplete removal, nerve damage and allergy to anesthesia.
Post-Care Instructions: I reviewed with the patient in detail post-care instructions. Patient is to keep the biopsy site dry overnight, and then apply bacitracin twice daily until healed. Patient may apply hydrogen peroxide soaks to remove any crusting.
Home Suture Removal Text: Patient was provided a home suture removal kit and will remove their sutures at home. If they have any questions or difficulties they will call the office.
Notification Instructions: Patient will be notified of biopsy results. However, patient instructed to call the office if not contacted within 2 weeks.
Billing Type: [employer]
Information: Selecting Yes will display possible errors in your note based on the variables you have selected. This validation is only offered as a suggestion for you. PLEASE NOTE THAT THE VALIDATION TEXT WILL BE REMOVED WHEN YOU FINALIZE YOUR NOTE. IF YOU WANT TO FAX A PRELIMINARY NOTE YOU WILL NEED TO TOGGLE THIS TO 'NO' IF YOU DO NOT WANT IT IN YOUR FAXED NOTE.
Body Location Override (Optional - Billing Will Still Be Based On Selected Body Map Location If Applicable): RIGHT SCAPULA
Biopsy Type: DIF
Epidermal Sutures: 4-0 Ethilon
Path Notes (To The Dermatopathologist): Size: 3.0 mm\\nR/o: prurigo nodularis vs bullous pemphigoid\\nDIF\\n(Patient clinically shows signs and symptoms of neurotic excoriation)
Home Suture Removal Text: Patient was provided a home suture removal kit and will remove their sutures at home.  If they have any questions or difficulties they will call the office.
Billing Type: Third-Party Bill

## 2022-12-07 NOTE — PROCEDURE: PHOTO-DOCUMENTATION
Script sent to pharmacy on 06/21/17 #30   
Photo Preface (Leave Blank If You Do Not Want): Photographs were obtained today
Detail Level: Zone

## 2022-12-07 NOTE — PROCEDURE: ADDITIONAL NOTES
Render Risk Assessment In Note?: no
Detail Level: Simple
Additional Notes: Patient clinically shows signs and symptoms of neurotic excoriation. \\nPatient will be referred to psychiatrist.

## 2023-01-17 ENCOUNTER — OFFICE VISIT (OUTPATIENT)
Dept: FAMILY MEDICINE CLINIC | Age: 63
End: 2023-01-17
Payer: COMMERCIAL

## 2023-01-17 ENCOUNTER — HOSPITAL ENCOUNTER (OUTPATIENT)
Age: 63
Setting detail: SPECIMEN
Discharge: HOME | End: 2023-01-17

## 2023-01-17 VITALS
BODY MASS INDEX: 20.93 KG/M2 | HEIGHT: 66 IN | DIASTOLIC BLOOD PRESSURE: 62 MMHG | SYSTOLIC BLOOD PRESSURE: 116 MMHG | OXYGEN SATURATION: 99 % | HEART RATE: 72 BPM | WEIGHT: 130.2 LBS

## 2023-01-17 DIAGNOSIS — I49.3 PVC'S (PREMATURE VENTRICULAR CONTRACTIONS): ICD-10-CM

## 2023-01-17 DIAGNOSIS — F41.9 ANXIETY: ICD-10-CM

## 2023-01-17 DIAGNOSIS — N39.0 RECURRENT UTI: ICD-10-CM

## 2023-01-17 DIAGNOSIS — E78.5 DYSLIPIDEMIA: ICD-10-CM

## 2023-01-17 DIAGNOSIS — E55.9 VITAMIN D DEFICIENCY: ICD-10-CM

## 2023-01-17 DIAGNOSIS — Z00.00 ANNUAL PHYSICAL EXAM: Primary | ICD-10-CM

## 2023-01-17 DIAGNOSIS — Z12.31 ENCOUNTER FOR SCREENING MAMMOGRAM FOR MALIGNANT NEOPLASM OF BREAST: ICD-10-CM

## 2023-01-17 DIAGNOSIS — Z86.19 H/O COLD SORES: ICD-10-CM

## 2023-01-17 LAB
ALBUMIN SERPL-MCNC: 4.5 G/DL (ref 3.5–5.2)
ALBUMIN/GLOBULIN RATIO: 2 (ref 1–2.5)
ALP BLD-CCNC: 56 U/L (ref 35–104)
ALT SERPL-CCNC: 15 U/L (ref 5–33)
ANION GAP SERPL CALCULATED.3IONS-SCNC: 11 MMOL/L (ref 9–17)
AST SERPL-CCNC: 23 U/L
BILIRUB SERPL-MCNC: 0.8 MG/DL (ref 0.3–1.2)
BUN BLDV-MCNC: 13 MG/DL (ref 8–23)
CALCIUM SERPL-MCNC: 9.7 MG/DL (ref 8.6–10.4)
CHLORIDE BLD-SCNC: 102 MMOL/L (ref 98–107)
CHOLESTEROL/HDL RATIO: 2.7
CHOLESTEROL: 264 MG/DL
CO2: 26 MMOL/L (ref 20–31)
CREAT SERPL-MCNC: 0.83 MG/DL (ref 0.5–0.9)
GFR SERPL CREATININE-BSD FRML MDRD: >60 ML/MIN/1.73M2
GLUCOSE BLD-MCNC: 99 MG/DL (ref 70–99)
HDLC SERPL-MCNC: 99 MG/DL
LDL CHOLESTEROL: 151 MG/DL (ref 0–130)
POTASSIUM SERPL-SCNC: 4.1 MMOL/L (ref 3.7–5.3)
SODIUM BLD-SCNC: 139 MMOL/L (ref 135–144)
TOTAL PROTEIN: 6.8 G/DL (ref 6.4–8.3)
TRIGL SERPL-MCNC: 71 MG/DL
VITAMIN D 25-HYDROXY: 59.2 NG/ML

## 2023-01-17 PROCEDURE — 99396 PREV VISIT EST AGE 40-64: CPT | Performed by: FAMILY MEDICINE

## 2023-01-17 RX ORDER — ALPRAZOLAM 0.25 MG/1
0.25 TABLET ORAL DAILY PRN
Qty: 30 TABLET | Refills: 0 | Status: SHIPPED | OUTPATIENT
Start: 2023-01-17 | End: 2023-02-16

## 2023-01-17 RX ORDER — VALACYCLOVIR HYDROCHLORIDE 1 G/1
2000 TABLET, FILM COATED ORAL 2 TIMES DAILY
Qty: 30 TABLET | Refills: 1 | Status: SHIPPED | OUTPATIENT
Start: 2023-01-17

## 2023-01-17 RX ORDER — CEPHALEXIN 250 MG/1
CAPSULE ORAL
Qty: 30 CAPSULE | Refills: 1 | Status: SHIPPED | OUTPATIENT
Start: 2023-01-17

## 2023-01-17 SDOH — ECONOMIC STABILITY: FOOD INSECURITY: WITHIN THE PAST 12 MONTHS, YOU WORRIED THAT YOUR FOOD WOULD RUN OUT BEFORE YOU GOT MONEY TO BUY MORE.: NEVER TRUE

## 2023-01-17 SDOH — ECONOMIC STABILITY: FOOD INSECURITY: WITHIN THE PAST 12 MONTHS, THE FOOD YOU BOUGHT JUST DIDN'T LAST AND YOU DIDN'T HAVE MONEY TO GET MORE.: NEVER TRUE

## 2023-01-17 ASSESSMENT — PATIENT HEALTH QUESTIONNAIRE - PHQ9
SUM OF ALL RESPONSES TO PHQ QUESTIONS 1-9: 0
DEPRESSION UNABLE TO ASSESS: FUNCTIONAL CAPACITY MOTIVATION LIMITS ACCURACY
2. FEELING DOWN, DEPRESSED OR HOPELESS: 0
SUM OF ALL RESPONSES TO PHQ9 QUESTIONS 1 & 2: 0
1. LITTLE INTEREST OR PLEASURE IN DOING THINGS: 0
SUM OF ALL RESPONSES TO PHQ QUESTIONS 1-9: 0

## 2023-01-17 ASSESSMENT — ENCOUNTER SYMPTOMS
COUGH: 0
BLOOD IN STOOL: 0
EYES NEGATIVE: 1
SHORTNESS OF BREATH: 0
CONSTIPATION: 0
ABDOMINAL PAIN: 0
ALLERGIC/IMMUNOLOGIC NEGATIVE: 1
DIARRHEA: 0

## 2023-01-17 ASSESSMENT — SOCIAL DETERMINANTS OF HEALTH (SDOH): HOW HARD IS IT FOR YOU TO PAY FOR THE VERY BASICS LIKE FOOD, HOUSING, MEDICAL CARE, AND HEATING?: SOMEWHAT HARD

## 2023-01-17 NOTE — PROGRESS NOTES
[address] [place] [address]  Dept: [phone]    1/17/2023    CHIEF COMPLAINT    Chief Complaint   Patient presents with    Annual Exam     Patient is here today for a physical       HPI    Satya Fine is a 57 y.o. female who presents   Chief Complaint   Patient presents with    Annual Exam     Patient is here today for a physical   .  Annual physical exam. Has been getting more active since having breast implant removed 3 years ago. Is finally able to start running again. Is active playing golf in the summer. Taking xanax rarely for anxiety but admits to having stress with her family currently. Hx of pvc's, not symptomatic      Vitals:    01/17/23 0843   BP: 116/62   Pulse: 72   SpO2: 99%   Weight: 130 lb 3.2 oz (59.1 kg)   Height: 5' 6\" (1.676 m)       REVIEW OF SYSTEMS    Review of Systems   Constitutional:  Negative for fatigue, fever and unexpected weight change. HENT: Negative. Eyes: Negative. Respiratory:  Negative for cough and shortness of breath. Cardiovascular:  Negative for chest pain and leg swelling. Gastrointestinal:  Negative for abdominal pain, blood in stool, constipation and diarrhea. Endocrine: Negative. Genitourinary:  Negative for frequency and urgency. Musculoskeletal: Negative. Skin: Negative. Allergic/Immunologic: Negative. Neurological:  Negative for dizziness and headaches. Hematological: Negative. Psychiatric/Behavioral:  Negative for sleep disturbance. The patient is nervous/anxious.       PAST MEDICAL HISTORY    Past Medical History:   Diagnosis Date    Acid reflux     Basal cell carcinoma (BCC) of back 2022    Cellulitis 08/2019    Colon polyp     Fracture of thumb     20's    Hypokalemia     Nasal bone fx-closed     as a child       FAMILY HISTORY    Family History   Problem Relation Age of Onset    Heart Disease Mother         CABGx 4 , MI Hypertension Mother     Arthritis Mother     Dementia Mother     Osteoporosis Mother     Cancer Mother [de-identified]        vulvar     Cervical Cancer Maternal Grandmother     Cancer Paternal Grandfather         throat    Hypertension Brother     Heart Disease Brother     Coronary Art Dis Brother     Asthma Brother     Heart Failure Father     Colon Polyps Sister         precancerous    Colon Polyps Nephew     Colon Cancer Maternal Aunt     Rectal Cancer Maternal Aunt        SOCIAL HISTORY    Social History     Socioeconomic History    Marital status:      Spouse name: None    Number of children: 2    Years of education: None    Highest education level: None   Occupational History    Occupation: book keeper   Tobacco Use    Smoking status: Never    Smokeless tobacco: Never   Vaping Use    Vaping Use: Never used   Substance and Sexual Activity    Alcohol use: Yes     Comment: occ    Drug use: No    Sexual activity: Yes     Birth control/protection: Surgical     Comment: [facility]     Social Determinants of Health     Financial Resource Strain: Medium Risk    Difficulty of Paying Living Expenses: Somewhat hard   Food Insecurity: No Food Insecurity    Worried About Running Out of Food in the Last Year: Never true    Ran Out of Food in the Last Year: Never true       SURGICAL HISTORY    Past Surgical History:   Procedure Laterality Date    BLADDER REMOVAL       cysts removed x 2    BREAST ENHANCEMENT SURGERY Bilateral      implants placed under muscle    BREAST REDUCTION SURGERY Bilateral     2019    BREAST SURGERY Left 08/15/2019    I&D left breast    BREAST SURGERY Left 08/15/2019    BREAST INCISION AND DRAINAGE performed by Cari Jack MD at [address] [place] [address] Bilateral 10/2017     SECTION  1984, 1989    x 2    COLONOSCOPY  2015    negative    DILATION AND CURETTAGE OF UTERUS  1981    miscarriages    DILATION AND CURETTAGE OF UTERUS  1998    dsyplasia    HERNIA REPAIR  2010    HYSTERECTOMY, TOTAL ABDOMINAL (CERVIX REMOVED)  [address] [facility]    SKIN LESION EXCISION  2022    Basal cell on back    TONSILLECTOMY AND ADENOIDECTOMY      3 yo       CURRENT MEDICATIONS    Current Outpatient Medications   Medication Sig Dispense Refill    TURMERIC PO Take by mouth      valACYclovir (VALTREX) 1 g tablet Take 2 tablets by mouth 2 times daily For 2 days 30 tablet 1    cephALEXin (KEFLEX) 250 MG capsule Take once after entercourse prn 30 capsule 1    ALPRAZolam (XANAX) 0.25 MG tablet Take 1 tablet by mouth daily as needed for Anxiety for up to 30 days. Max Daily Amount: 0.25 mg 30 tablet 0    cetirizine (ZYRTEC) 10 MG tablet Take 10 mg by mouth daily      Omega-3 Fatty Acids (FISH OIL) 500 MG CAPS Take 500 mg by mouth 2 times daily      sodium chloride (OCEAN, BABY AYR) 0.65 % nasal spray 1 spray by Nasal route 2 times daily      Cholecalciferol (VITAMIN D) 2000 units TABS tablet Take 2,000 Units by mouth daily      Probiotic Product (PROBIOTIC-10 PO) Take 1 tablet by mouth daily       NONFORMULARY Acid ease prn      Potassium 99 MG TABS Take 1 tablet by mouth 2 times daily        No current facility-administered medications for this visit. ALLERGIES    Allergies   Allergen Reactions    Macrodantin [Nitrofurantoin] Hives    Sulfa Antibiotics Hives and Other (See Comments)     Itchy throat       PHYSICAL EXAM   Physical Exam  Vitals reviewed. Constitutional:       Appearance: She is well-developed. HENT:      Head: Normocephalic. Right Ear: There is impacted cerumen. Eyes:      Pupils: Pupils are equal, round, and reactive to light. Neck:      Thyroid: No thyromegaly. Cardiovascular:      Rate and Rhythm: Normal rate and regular rhythm. FrequentExtrasystoles are present. Heart sounds: Normal heart sounds. No murmur heard. Pulmonary:      Effort: Pulmonary effort is normal.      Breath sounds: Normal breath sounds. No wheezing or rales. Abdominal:      Palpations: Abdomen is soft. Tenderness: There is no abdominal tenderness. There is no guarding or rebound. Musculoskeletal:         General: No tenderness or deformity. Normal range of motion. Cervical back: Normal range of motion and neck supple. Lymphadenopathy:      Cervical: No cervical adenopathy. Skin:     General: Skin is warm and dry. Neurological:      Mental Status: She is alert and oriented to person, place, and time. Psychiatric:         Mood and Affect: Mood normal.         Behavior: Behavior normal.         Thought Content: Thought content normal.         Judgment: Judgment normal.       ASSESSMENT/PLAN  1. Annual physical exam  Continue healthy lifestyle. 2. Encounter for screening mammogram for malignant neoplasm of breast    - [facility] DIGITAL SCREEN W OR WO CAD BILATERAL; Future    3. Vitamin D deficiency  Adjust supplement if indicated  - Vitamin D 25 Hydroxy; Future    4. PVC's (premature ventricular contractions)  Avoid stressors if possible  - Comprehensive Metabolic Panel; Future    5. Dyslipidemia  Recheck and follow healthy diet  - Comprehensive Metabolic Panel; Future  - Lipid Panel; Future    6. H/O cold sores  Use Valtrex as needed  - valACYclovir (VALTREX) 1 g tablet; Take 2 tablets by mouth 2 times daily For 2 days  Dispense: 30 tablet; Refill: 1    7. Anxiety  Use Xanax as needed  - ALPRAZolam (XANAX) 0.25 MG tablet; Take 1 tablet by mouth daily as needed for Anxiety for up to 30 days. Max Daily Amount: 0.25 mg  Dispense: 30 tablet; Refill: 0    8. Recurrent UTI  Use Keflex as previously prescribed by gynecologist to prevent recurrent UTI  - cephALEXin (KEFLEX) 250 MG capsule; Take once after entercourse prn  Dispense: 30 capsule; Refill: 1     Melinda received counseling on the following healthy behaviors: nutrition, exercise, and medication adherence  Reviewed prior labs and health maintenance. Continue current medications, diet and exercise.   Discussed use, benefit, and side effects of prescribed medications. Barriers to medication compliance addressed. Patient given educational materials - see patient instructions. All patient questions answered. Patient voiced understanding. No follow-ups on file.         Electronically signed by Sven Quiñones MD on 1/17/23 at 9:00 AM EST

## 2023-01-24 ENCOUNTER — APPOINTMENT (RX ONLY)
Dept: URBAN - METROPOLITAN AREA CLINIC 98 | Facility: CLINIC | Age: 63
Setting detail: DERMATOLOGY
End: 2023-01-24

## 2023-01-24 DIAGNOSIS — L85.3 XEROSIS CUTIS: ICD-10-CM

## 2023-01-24 DIAGNOSIS — L27.0 GENERALIZED SKIN ERUPTION DUE TO DRUGS AND MEDICAMENTS TAKEN INTERNALLY: ICD-10-CM

## 2023-01-24 DIAGNOSIS — L98.1 FACTITIAL DERMATITIS: ICD-10-CM

## 2023-01-24 PROCEDURE — ? TREATMENT REGIMEN

## 2023-01-24 PROCEDURE — ? PRESCRIPTION

## 2023-01-24 PROCEDURE — 99213 OFFICE O/P EST LOW 20 MIN: CPT

## 2023-01-24 PROCEDURE — ? COUNSELING

## 2023-01-24 PROCEDURE — ? PATHOLOGY DISCUSSION

## 2023-01-24 RX ORDER — LEVOCETIRIZINE DIHYDROCHLORIDE 5 MG/1
TABLET, FILM COATED ORAL
Qty: 30 | Refills: 0 | Status: ERX

## 2023-01-24 RX ORDER — MUPIROCIN 20 MG/G
OINTMENT TOPICAL
Qty: 22 | Refills: 0 | Status: ERX

## 2023-01-24 NOTE — PROCEDURE: TREATMENT REGIMEN
Detail Level: Zone
Initiate Treatment: mupirocin 2 % topical ointment \\nSig: Apply twice daily to affected areas of the body for two weeks on and one week off. Repeat as needed for flare ups. Avoid face and genital areas.
Continue Regimen: levocetirizine 5 mg tablet \\nSig: Take one tablet every day with a glass of water in the morning for 30 days.

## 2023-02-03 ENCOUNTER — APPOINTMENT (RX ONLY)
Dept: URBAN - METROPOLITAN AREA CLINIC 98 | Facility: CLINIC | Age: 63
Setting detail: DERMATOLOGY
End: 2023-02-03

## 2023-02-03 ENCOUNTER — RX ONLY (OUTPATIENT)
Age: 63
Setting detail: RX ONLY
End: 2023-02-03

## 2023-02-03 DIAGNOSIS — L98.1 FACTITIAL DERMATITIS: ICD-10-CM

## 2023-02-03 PROCEDURE — 99212 OFFICE O/P EST SF 10 MIN: CPT

## 2023-02-03 PROCEDURE — ? PRESCRIPTION

## 2023-02-03 PROCEDURE — ? TREATMENT REGIMEN

## 2023-02-03 PROCEDURE — ? COUNSELING

## 2023-02-03 RX ORDER — MUPIROCIN 20 MG/G
OINTMENT TOPICAL
Qty: 22 | Refills: 0 | Status: ERX

## 2023-02-03 RX ORDER — CHLORHEXIDINE GLUCONATE 2 G/100ML
SOLUTION TOPICAL
Qty: 118 | Refills: 0 | Status: ERX

## 2023-02-03 NOTE — PROCEDURE: TREATMENT REGIMEN
Continue Regimen: mupirocin 2 % topical ointment TP\\nSig: Apply twice daily to affected areas of the body for two weeks on and one week off. Repeat as needed for flare ups.  Avoid face and genital areas
Initiate Treatment: chlorhexidine gluconate 2 % topical liquid \\nSig: Apply to affected areas of the body daily. Avoid mucosal membrane (mouth) eyes and genital area.
Detail Level: Zone

## 2023-02-14 ENCOUNTER — HOSPITAL ENCOUNTER (OUTPATIENT)
Dept: MAMMOGRAPHY | Age: 63
Discharge: HOME | End: 2023-02-16
Payer: COMMERCIAL

## 2023-02-14 DIAGNOSIS — Z12.31 ENCOUNTER FOR SCREENING MAMMOGRAM FOR MALIGNANT NEOPLASM OF BREAST: ICD-10-CM

## 2023-02-14 PROCEDURE — 77067 SCR MAMMO BI INCL CAD: CPT

## 2023-11-29 ENCOUNTER — APPOINTMENT (RX ONLY)
Dept: URBAN - METROPOLITAN AREA CLINIC 98 | Facility: CLINIC | Age: 63
Setting detail: DERMATOLOGY
End: 2023-11-29

## 2023-11-29 DIAGNOSIS — L98.1 FACTITIAL DERMATITIS: ICD-10-CM

## 2023-11-29 DIAGNOSIS — L85.3 XEROSIS CUTIS: ICD-10-CM

## 2023-11-29 PROCEDURE — ? OTC TREATMENT REGIMEN

## 2023-11-29 PROCEDURE — ? TREATMENT REGIMEN

## 2023-11-29 PROCEDURE — ? COUNSELING

## 2023-11-29 PROCEDURE — 99213 OFFICE O/P EST LOW 20 MIN: CPT

## 2023-11-29 ASSESSMENT — LOCATION DETAILED DESCRIPTION DERM
LOCATION DETAILED: MID TRAPEZIAL NECK
LOCATION DETAILED: RIGHT MEDIAL UPPER BACK
LOCATION DETAILED: RIGHT BUTTOCK
LOCATION DETAILED: LEFT MEDIAL UPPER BACK
LOCATION DETAILED: LEFT INFERIOR LATERAL UPPER BACK
LOCATION DETAILED: SUPERIOR THORACIC SPINE

## 2023-11-29 ASSESSMENT — LOCATION ZONE DERM
LOCATION ZONE: NECK
LOCATION ZONE: TRUNK

## 2023-11-29 ASSESSMENT — LOCATION SIMPLE DESCRIPTION DERM
LOCATION SIMPLE: TRAPEZIAL NECK
LOCATION SIMPLE: RIGHT UPPER BACK
LOCATION SIMPLE: UPPER BACK
LOCATION SIMPLE: LEFT UPPER BACK
LOCATION SIMPLE: RIGHT BUTTOCK

## 2023-11-29 NOTE — PROCEDURE: COUNSELING
Detail Level: Detailed
Antihistamine Recommendations: may take OTC Claritin during day and Benadryl at bedtime as needed for itching.

## 2023-11-29 NOTE — PROCEDURE: OTC TREATMENT REGIMEN
Patient Specific Otc Recommendations (Will Not Stick From Patient To Patient): Cerave or cetaphil moisturizing cream, and Vaseline ointment
Detail Level: Zone

## 2024-03-21 ENCOUNTER — APPOINTMENT (RX ONLY)
Dept: URBAN - METROPOLITAN AREA CLINIC 98 | Facility: CLINIC | Age: 64
Setting detail: DERMATOLOGY
End: 2024-03-21

## 2024-03-21 DIAGNOSIS — L90.5 SCAR CONDITIONS AND FIBROSIS OF SKIN: ICD-10-CM

## 2024-03-21 DIAGNOSIS — L98.1 FACTITIAL DERMATITIS: ICD-10-CM

## 2024-03-21 PROCEDURE — 99213 OFFICE O/P EST LOW 20 MIN: CPT

## 2024-03-21 PROCEDURE — ? TREATMENT REGIMEN

## 2024-03-21 PROCEDURE — ? PRESCRIPTION

## 2024-03-21 PROCEDURE — ? COUNSELING

## 2024-03-21 PROCEDURE — ? ADDITIONAL NOTES

## 2024-03-21 RX ORDER — SILVER SULFADIAZINE 10 MG/G
CREAM TOPICAL
Qty: 85 | Refills: 0 | Status: ERX | COMMUNITY
Start: 2024-03-21

## 2024-03-21 RX ADMIN — SILVER SULFADIAZINE: 10 CREAM TOPICAL at 00:00

## 2024-03-21 ASSESSMENT — LOCATION DETAILED DESCRIPTION DERM
LOCATION DETAILED: MID TRAPEZIAL NECK
LOCATION DETAILED: LEFT SUPERIOR UPPER BACK
LOCATION DETAILED: RIGHT MID-UPPER BACK
LOCATION DETAILED: RIGHT SUPERIOR MEDIAL MIDBACK
LOCATION DETAILED: RIGHT MEDIAL UPPER BACK
LOCATION DETAILED: RIGHT BUTTOCK
LOCATION DETAILED: LEFT MEDIAL UPPER BACK
LOCATION DETAILED: LEFT INFERIOR LATERAL UPPER BACK

## 2024-03-21 ASSESSMENT — LOCATION SIMPLE DESCRIPTION DERM
LOCATION SIMPLE: RIGHT UPPER BACK
LOCATION SIMPLE: RIGHT BUTTOCK
LOCATION SIMPLE: RIGHT LOWER BACK
LOCATION SIMPLE: TRAPEZIAL NECK
LOCATION SIMPLE: RIGHT UPPER BACK
LOCATION SIMPLE: LEFT UPPER BACK
LOCATION SIMPLE: LEFT UPPER BACK

## 2024-03-21 ASSESSMENT — LOCATION ZONE DERM
LOCATION ZONE: NECK
LOCATION ZONE: TRUNK
LOCATION ZONE: TRUNK

## 2024-03-28 ENCOUNTER — HOSPITAL ENCOUNTER (OUTPATIENT)
Age: 64
Setting detail: SPECIMEN
Discharge: HOME | End: 2024-03-28

## 2024-03-28 ENCOUNTER — OFFICE VISIT (OUTPATIENT)
Dept: FAMILY MEDICINE CLINIC | Age: 64
End: 2024-03-28
Payer: MEDICARE

## 2024-03-28 VITALS
WEIGHT: 134 LBS | BODY MASS INDEX: 21.53 KG/M2 | DIASTOLIC BLOOD PRESSURE: 82 MMHG | SYSTOLIC BLOOD PRESSURE: 126 MMHG | HEIGHT: 66 IN | HEART RATE: 66 BPM

## 2024-03-28 DIAGNOSIS — E55.9 VITAMIN D DEFICIENCY: ICD-10-CM

## 2024-03-28 DIAGNOSIS — Z12.31 ENCOUNTER FOR SCREENING MAMMOGRAM FOR MALIGNANT NEOPLASM OF BREAST: ICD-10-CM

## 2024-03-28 DIAGNOSIS — Z86.19 H/O COLD SORES: ICD-10-CM

## 2024-03-28 DIAGNOSIS — Z00.00 WELCOME TO MEDICARE PREVENTIVE VISIT: Primary | ICD-10-CM

## 2024-03-28 DIAGNOSIS — E78.5 DYSLIPIDEMIA: ICD-10-CM

## 2024-03-28 DIAGNOSIS — I49.3 PVC'S (PREMATURE VENTRICULAR CONTRACTIONS): ICD-10-CM

## 2024-03-28 LAB
25(OH)D3 SERPL-MCNC: 58.8 NG/ML (ref 30–100)
ALBUMIN SERPL-MCNC: 4.7 G/DL (ref 3.5–5.2)
ALBUMIN/GLOB SERPL: 2 {RATIO} (ref 1–2.5)
ALP SERPL-CCNC: 50 U/L (ref 35–104)
ALT SERPL-CCNC: 18 U/L (ref 10–35)
ANION GAP SERPL CALCULATED.3IONS-SCNC: 10 MMOL/L (ref 9–16)
AST SERPL-CCNC: 26 U/L (ref 10–35)
BILIRUB SERPL-MCNC: 0.5 MG/DL (ref 0–1.2)
BUN SERPL-MCNC: 15 MG/DL (ref 8–23)
CALCIUM SERPL-MCNC: 9.5 MG/DL (ref 8.6–10.4)
CHLORIDE SERPL-SCNC: 105 MMOL/L (ref 98–107)
CHOLEST SERPL-MCNC: 289 MG/DL (ref 0–199)
CHOLESTEROL/HDL RATIO: 3
CO2 SERPL-SCNC: 26 MMOL/L (ref 20–31)
CREAT SERPL-MCNC: 0.9 MG/DL (ref 0.5–0.9)
GFR SERPL CREATININE-BSD FRML MDRD: 73 ML/MIN/1.73M2
GLUCOSE SERPL-MCNC: 89 MG/DL (ref 74–99)
HDLC SERPL-MCNC: 101 MG/DL
LDLC SERPL CALC-MCNC: 176 MG/DL (ref 0–100)
POTASSIUM SERPL-SCNC: 4.9 MMOL/L (ref 3.7–5.3)
PROT SERPL-MCNC: 7.1 G/DL (ref 6.6–8.7)
SODIUM SERPL-SCNC: 141 MMOL/L (ref 136–145)
TRIGL SERPL-MCNC: 61 MG/DL
VLDLC SERPL CALC-MCNC: 12 MG/DL

## 2024-03-28 PROCEDURE — G0402 INITIAL PREVENTIVE EXAM: HCPCS | Performed by: FAMILY MEDICINE

## 2024-03-28 PROCEDURE — 1123F ACP DISCUSS/DSCN MKR DOCD: CPT | Performed by: FAMILY MEDICINE

## 2024-03-28 PROCEDURE — 3017F COLORECTAL CA SCREEN DOC REV: CPT | Performed by: FAMILY MEDICINE

## 2024-03-28 RX ORDER — VALACYCLOVIR HYDROCHLORIDE 1 G/1
1000 TABLET, FILM COATED ORAL DAILY
Qty: 30 TABLET | Refills: 1 | Status: SHIPPED | OUTPATIENT
Start: 2024-03-28

## 2024-03-28 SDOH — ECONOMIC STABILITY: FOOD INSECURITY: WITHIN THE PAST 12 MONTHS, YOU WORRIED THAT YOUR FOOD WOULD RUN OUT BEFORE YOU GOT MONEY TO BUY MORE.: NEVER TRUE

## 2024-03-28 SDOH — ECONOMIC STABILITY: FOOD INSECURITY: WITHIN THE PAST 12 MONTHS, THE FOOD YOU BOUGHT JUST DIDN'T LAST AND YOU DIDN'T HAVE MONEY TO GET MORE.: NEVER TRUE

## 2024-03-28 SDOH — ECONOMIC STABILITY: HOUSING INSECURITY
IN THE LAST 12 MONTHS, WAS THERE A TIME WHEN YOU DID NOT HAVE A STEADY PLACE TO SLEEP OR SLEPT IN A SHELTER (INCLUDING NOW)?: YES

## 2024-03-28 SDOH — ECONOMIC STABILITY: INCOME INSECURITY: HOW HARD IS IT FOR YOU TO PAY FOR THE VERY BASICS LIKE FOOD, HOUSING, MEDICAL CARE, AND HEATING?: NOT HARD AT ALL

## 2024-03-28 ASSESSMENT — PATIENT HEALTH QUESTIONNAIRE - PHQ9
SUM OF ALL RESPONSES TO PHQ QUESTIONS 1-9: 0
SUM OF ALL RESPONSES TO PHQ9 QUESTIONS 1 & 2: 0
SUM OF ALL RESPONSES TO PHQ QUESTIONS 1-9: 0
2. FEELING DOWN, DEPRESSED OR HOPELESS: NOT AT ALL
1. LITTLE INTEREST OR PLEASURE IN DOING THINGS: NOT AT ALL
SUM OF ALL RESPONSES TO PHQ QUESTIONS 1-9: 0
SUM OF ALL RESPONSES TO PHQ QUESTIONS 1-9: 0

## 2024-03-28 ASSESSMENT — LIFESTYLE VARIABLES
HAS A RELATIVE, FRIEND, DOCTOR, OR ANOTHER HEALTH PROFESSIONAL EXPRESSED CONCERN ABOUT YOUR DRINKING OR SUGGESTED YOU CUT DOWN: NO
HOW OFTEN DURING THE LAST YEAR HAVE YOU FAILED TO DO WHAT WAS NORMALLY EXPECTED FROM YOU BECAUSE OF DRINKING: NEVER
HOW OFTEN DURING THE LAST YEAR HAVE YOU FOUND THAT YOU WERE NOT ABLE TO STOP DRINKING ONCE YOU HAD STARTED: NEVER
HAVE YOU OR SOMEONE ELSE BEEN INJURED AS A RESULT OF YOUR DRINKING: NO
HOW OFTEN DURING THE LAST YEAR HAVE YOU BEEN UNABLE TO REMEMBER WHAT HAPPENED THE NIGHT BEFORE BECAUSE YOU HAD BEEN DRINKING: NEVER
HOW OFTEN DO YOU HAVE A DRINK CONTAINING ALCOHOL: 2-3 TIMES A WEEK
HOW OFTEN DURING THE LAST YEAR HAVE YOU NEEDED AN ALCOHOLIC DRINK FIRST THING IN THE MORNING TO GET YOURSELF GOING AFTER A NIGHT OF HEAVY DRINKING: NEVER
HOW MANY STANDARD DRINKS CONTAINING ALCOHOL DO YOU HAVE ON A TYPICAL DAY: 3 OR 4
HOW OFTEN DURING THE LAST YEAR HAVE YOU HAD A FEELING OF GUILT OR REMORSE AFTER DRINKING: NEVER

## 2024-03-28 ASSESSMENT — ENCOUNTER SYMPTOMS
ABDOMINAL PAIN: 0
ALLERGIC/IMMUNOLOGIC NEGATIVE: 1
EYES NEGATIVE: 1
COUGH: 0
CONSTIPATION: 0
DIARRHEA: 0
SHORTNESS OF BREATH: 0
BLOOD IN STOOL: 0

## 2024-03-28 NOTE — PROGRESS NOTES
Medicare Annual Wellness Visit    Melinda Renteria is here for Medicare AWV  AMW. Following healthy diet and exercising regularly.         Assessment & Plan   Welcome to Medicare preventive visit  Encounter for screening mammogram for malignant neoplasm of breast  -     MICKEY DIGITAL SCREEN W OR WO CAD BILATERAL; Future  Vitamin D deficiency  -     Vitamin D 25 Hydroxy; Future  Dyslipidemia  -     Lipid Panel; Future  PVC's (premature ventricular contractions)  -     Comprehensive Metabolic Panel; Future  H/O cold sores  -     valACYclovir (VALTREX) 1 g tablet; Take 1 tablet by mouth daily, Disp-30 tablet, R-1Normal  Recommendations for Preventive Services Due: see orders and patient instructions/AVS.  Recommended screening schedule for the next 5-10 years is provided to the patient in written form: see Patient Instructions/AVS.     No follow-ups on file.     Subjective     Review of Systems   Constitutional:  Negative for fatigue, fever and unexpected weight change.   HENT: Negative.     Eyes: Negative.    Respiratory:  Negative for cough and shortness of breath.    Cardiovascular:  Negative for chest pain and leg swelling.   Gastrointestinal:  Negative for abdominal pain, blood in stool, constipation and diarrhea.   Endocrine: Negative.    Genitourinary:  Negative for frequency and urgency.   Musculoskeletal:  Positive for arthralgias (intermittent pain in rt foot, left elbow).   Skin: Negative.    Allergic/Immunologic: Negative.    Neurological:  Negative for dizziness and headaches.   Hematological: Negative.    Psychiatric/Behavioral:  Negative for sleep disturbance. The patient is not nervous/anxious.        Patient's complete Health Risk Assessment and screening values have been reviewed and are found in Flowsheets. The following problems were reviewed today and where indicated follow up appointments were made and/or referrals ordered.    Positive Risk Factor Screenings with Interventions:               Pt has

## 2024-03-28 NOTE — PATIENT INSTRUCTIONS

## 2024-04-24 ENCOUNTER — TELEPHONE (OUTPATIENT)
Dept: FAMILY MEDICINE CLINIC | Age: 64
End: 2024-04-24

## 2024-04-24 DIAGNOSIS — R07.89 ACUTE CHEST WALL PAIN: Primary | ICD-10-CM

## 2024-04-24 NOTE — TELEPHONE ENCOUNTER
Patients declines any rashes, coughs or fever and it hurts a little while breathing     Please advise

## 2024-04-24 NOTE — TELEPHONE ENCOUNTER
Patient called in stating she is having the following sx     Sx right ribcage pain, side pain, mid back    Duration 1 week     Medication Advil     [id number] - [place], [place] - [address] [phone] - F [phone] [[id number]]     Please advise

## 2024-04-25 ENCOUNTER — HOSPITAL ENCOUNTER (OUTPATIENT)
Age: 64
Discharge: HOME | End: 2024-04-27
Payer: MEDICARE

## 2024-04-25 ENCOUNTER — HOSPITAL ENCOUNTER (OUTPATIENT)
Dept: GENERAL RADIOLOGY | Age: 64
Discharge: HOME | End: 2024-04-27
Payer: MEDICARE

## 2024-04-25 DIAGNOSIS — R07.89 ACUTE CHEST WALL PAIN: ICD-10-CM

## 2024-04-25 PROCEDURE — 71101 X-RAY EXAM UNILAT RIBS/CHEST: CPT

## 2024-05-17 ENCOUNTER — HOSPITAL ENCOUNTER (OUTPATIENT)
Dept: MAMMOGRAPHY | Age: 64
End: 2024-05-17
Payer: MEDICARE

## 2024-05-17 DIAGNOSIS — Z12.31 ENCOUNTER FOR SCREENING MAMMOGRAM FOR MALIGNANT NEOPLASM OF BREAST: ICD-10-CM

## 2024-05-17 PROCEDURE — 77063 BREAST TOMOSYNTHESIS BI: CPT

## 2024-06-20 ENCOUNTER — APPOINTMENT (RX ONLY)
Dept: URBAN - METROPOLITAN AREA CLINIC 98 | Facility: CLINIC | Age: 64
Setting detail: DERMATOLOGY
End: 2024-06-20

## 2024-06-20 DIAGNOSIS — D22 MELANOCYTIC NEVI: ICD-10-CM

## 2024-06-20 DIAGNOSIS — L98.1 FACTITIAL DERMATITIS: ICD-10-CM

## 2024-06-20 PROBLEM — D22.62 MELANOCYTIC NEVI OF LEFT UPPER LIMB, INCLUDING SHOULDER: Status: ACTIVE | Noted: 2024-06-20

## 2024-06-20 PROCEDURE — 99213 OFFICE O/P EST LOW 20 MIN: CPT

## 2024-06-20 PROCEDURE — ? COUNSELING

## 2024-06-20 PROCEDURE — ? ITCH-SCRATCH CYCLE COUNSELING

## 2024-06-20 ASSESSMENT — LOCATION ZONE DERM: LOCATION ZONE: ARM

## 2024-06-20 ASSESSMENT — LOCATION SIMPLE DESCRIPTION DERM: LOCATION SIMPLE: LEFT FOREARM

## 2024-06-20 ASSESSMENT — LOCATION DETAILED DESCRIPTION DERM: LOCATION DETAILED: LEFT PROXIMAL DORSAL FOREARM

## 2024-07-02 ENCOUNTER — APPOINTMENT (RX ONLY)
Dept: URBAN - METROPOLITAN AREA CLINIC 98 | Facility: CLINIC | Age: 64
Setting detail: DERMATOLOGY
End: 2024-07-02

## 2024-07-02 DIAGNOSIS — D22 MELANOCYTIC NEVI: ICD-10-CM

## 2024-07-02 DIAGNOSIS — L81.4 OTHER MELANIN HYPERPIGMENTATION: ICD-10-CM

## 2024-07-02 DIAGNOSIS — L98.1 FACTITIAL DERMATITIS: ICD-10-CM

## 2024-07-02 PROBLEM — D22.9 MELANOCYTIC NEVI, UNSPECIFIED: Status: ACTIVE | Noted: 2024-07-02

## 2024-07-02 PROCEDURE — 96372 THER/PROPH/DIAG INJ SC/IM: CPT

## 2024-07-02 PROCEDURE — ? ITCH-SCRATCH CYCLE COUNSELING

## 2024-07-02 PROCEDURE — ? TREATMENT REGIMEN

## 2024-07-02 PROCEDURE — 99213 OFFICE O/P EST LOW 20 MIN: CPT | Mod: 25

## 2024-07-02 PROCEDURE — ? DUPIXENT INITIATION

## 2024-07-02 PROCEDURE — ? COUNSELING

## 2024-07-02 PROCEDURE — ? PRESCRIPTION

## 2024-07-02 PROCEDURE — ? DUPIXENT INJECTION

## 2024-07-02 RX ORDER — ANORECTAL OINTMENT 15.7; .44; 24; 20.6 G/100G; G/100G; G/100G; G/100G
OINTMENT TOPICAL
Qty: 113 | Refills: 1 | Status: ERX

## 2024-07-02 RX ORDER — ANORECTAL OINTMENT 15.7; .44; 24; 20.6 G/100G; G/100G; G/100G; G/100G
OINTMENT TOPICAL
Qty: 113 | Refills: 1 | Status: CANCELLED | COMMUNITY
Start: 2024-07-02

## 2024-07-02 RX ORDER — SILVER SULFADIAZINE 10 MG/G
CREAM TOPICAL
Qty: 85 | Refills: 2 | Status: CANCELLED

## 2024-07-02 RX ORDER — SILVER SULFADIAZINE 10 MG/G
CREAM TOPICAL
Qty: 85 | Refills: 2 | Status: ERX

## 2024-07-02 RX ADMIN — ANORECTAL OINTMENT: 15.7; .44; 24; 20.6 OINTMENT TOPICAL at 00:00

## 2024-07-02 ASSESSMENT — LOCATION SIMPLE DESCRIPTION DERM: LOCATION SIMPLE: LEFT SHOULDER

## 2024-07-02 ASSESSMENT — LOCATION ZONE DERM: LOCATION ZONE: ARM

## 2024-07-02 ASSESSMENT — LOCATION DETAILED DESCRIPTION DERM: LOCATION DETAILED: LEFT POSTERIOR SHOULDER

## 2024-07-23 ENCOUNTER — APPOINTMENT (RX ONLY)
Dept: URBAN - METROPOLITAN AREA CLINIC 98 | Facility: CLINIC | Age: 64
Setting detail: DERMATOLOGY
End: 2024-07-23

## 2024-07-23 DIAGNOSIS — L98.1 FACTITIAL DERMATITIS: ICD-10-CM

## 2024-07-23 DIAGNOSIS — B36.0 PITYRIASIS VERSICOLOR: ICD-10-CM

## 2024-07-23 PROCEDURE — 96372 THER/PROPH/DIAG INJ SC/IM: CPT

## 2024-07-23 PROCEDURE — ? PRESCRIPTION

## 2024-07-23 PROCEDURE — 99213 OFFICE O/P EST LOW 20 MIN: CPT | Mod: 25

## 2024-07-23 PROCEDURE — ? ADDITIONAL NOTES

## 2024-07-23 PROCEDURE — ? DUPIXENT INJECTION

## 2024-07-23 PROCEDURE — ? ITCH-SCRATCH CYCLE COUNSELING

## 2024-07-23 PROCEDURE — ? TREATMENT REGIMEN

## 2024-07-23 PROCEDURE — ? COUNSELING

## 2024-07-23 RX ORDER — SILVER SULFADIAZINE 10 MG/G
CREAM TOPICAL
Qty: 85 | Refills: 2 | Status: CANCELLED

## 2024-07-23 RX ORDER — KETOCONAZOLE 20 MG/ML
SHAMPOO, SUSPENSION TOPICAL
Qty: 120 | Refills: 2 | Status: ERX | COMMUNITY
Start: 2024-07-23

## 2024-07-23 RX ORDER — SILVER SULFADIAZINE 10 MG/G
CREAM TOPICAL
Qty: 85 | Refills: 2 | Status: ERX

## 2024-07-23 RX ADMIN — KETOCONAZOLE: 20 SHAMPOO, SUSPENSION TOPICAL at 00:00

## 2024-07-23 ASSESSMENT — LOCATION DETAILED DESCRIPTION DERM: LOCATION DETAILED: LEFT POSTERIOR SHOULDER

## 2024-07-23 ASSESSMENT — LOCATION ZONE DERM: LOCATION ZONE: ARM

## 2024-07-23 ASSESSMENT — LOCATION SIMPLE DESCRIPTION DERM: LOCATION SIMPLE: LEFT SHOULDER

## 2024-08-08 ENCOUNTER — APPOINTMENT (RX ONLY)
Dept: URBAN - METROPOLITAN AREA CLINIC 98 | Facility: CLINIC | Age: 64
Setting detail: DERMATOLOGY
End: 2024-08-08

## 2024-08-08 DIAGNOSIS — L98.1 FACTITIAL DERMATITIS: ICD-10-CM

## 2024-08-08 DIAGNOSIS — D22 MELANOCYTIC NEVI: ICD-10-CM

## 2024-08-08 DIAGNOSIS — L81.0 POSTINFLAMMATORY HYPERPIGMENTATION: ICD-10-CM

## 2024-08-08 PROBLEM — D22.9 MELANOCYTIC NEVI, UNSPECIFIED: Status: ACTIVE | Noted: 2024-08-08

## 2024-08-08 PROCEDURE — ? TREATMENT REGIMEN

## 2024-08-08 PROCEDURE — ? PRESCRIPTION

## 2024-08-08 PROCEDURE — ? DUPIXENT INJECTION

## 2024-08-08 PROCEDURE — ? COUNSELING

## 2024-08-08 PROCEDURE — 96372 THER/PROPH/DIAG INJ SC/IM: CPT

## 2024-08-08 PROCEDURE — ? ITCH-SCRATCH CYCLE COUNSELING

## 2024-08-08 PROCEDURE — 99213 OFFICE O/P EST LOW 20 MIN: CPT | Mod: 25

## 2024-08-08 RX ORDER — SILVER SULFADIAZINE 10 MG/G
CREAM TOPICAL
Qty: 85 | Refills: 2 | Status: CANCELLED

## 2024-08-08 RX ORDER — SILVER SULFADIAZINE 10 MG/G
CREAM TOPICAL
Qty: 85 | Refills: 2 | Status: ERX

## 2024-08-08 RX ORDER — DUPILUMAB 300 MG/2ML
INJECTION, SOLUTION SUBCUTANEOUS
Qty: 2 | Refills: 6 | Status: ERX | COMMUNITY
Start: 2024-08-08

## 2024-08-08 RX ORDER — MUPIROCIN 20 MG/G
OINTMENT TOPICAL
Qty: 22 | Refills: 1 | Status: ERX | COMMUNITY
Start: 2024-08-08

## 2024-08-08 RX ADMIN — DUPILUMAB: 300 INJECTION, SOLUTION SUBCUTANEOUS at 00:00

## 2024-08-08 RX ADMIN — MUPIROCIN: 20 OINTMENT TOPICAL at 00:00

## 2024-08-08 ASSESSMENT — LOCATION ZONE DERM: LOCATION ZONE: ARM

## 2024-08-08 ASSESSMENT — LOCATION SIMPLE DESCRIPTION DERM: LOCATION SIMPLE: LEFT SHOULDER

## 2024-08-08 ASSESSMENT — LOCATION DETAILED DESCRIPTION DERM: LOCATION DETAILED: LEFT POSTERIOR SHOULDER

## 2025-02-20 ENCOUNTER — APPOINTMENT (OUTPATIENT)
Dept: URBAN - METROPOLITAN AREA CLINIC 98 | Facility: CLINIC | Age: 65
Setting detail: DERMATOLOGY
End: 2025-02-20

## 2025-02-20 DIAGNOSIS — L98.1 FACTITIAL DERMATITIS: ICD-10-CM

## 2025-02-20 DIAGNOSIS — L29.89 OTHER PRURITUS: ICD-10-CM | Status: INADEQUATELY CONTROLLED

## 2025-02-20 PROCEDURE — ? TREATMENT REGIMEN

## 2025-02-20 PROCEDURE — ? PRESCRIPTION

## 2025-02-20 PROCEDURE — ? COUNSELING

## 2025-02-20 PROCEDURE — 99214 OFFICE O/P EST MOD 30 MIN: CPT

## 2025-02-20 RX ORDER — CLOBETASOL PROPIONATE 0.5 MG/G
CREAM TOPICAL BID
Qty: 60 | Refills: 1 | Status: ERX | COMMUNITY
Start: 2025-02-20

## 2025-02-20 RX ORDER — HYDROXYZINE HYDROCHLORIDE 25 MG/1
TABLET, FILM COATED ORAL QHS
Qty: 60 | Refills: 0 | Status: ERX | COMMUNITY
Start: 2025-02-20

## 2025-02-20 RX ADMIN — HYDROXYZINE HYDROCHLORIDE: 25 TABLET, FILM COATED ORAL at 00:00

## 2025-02-20 RX ADMIN — CLOBETASOL PROPIONATE: 0.5 CREAM TOPICAL at 00:00

## 2025-03-10 RX ORDER — DUPILUMAB 300 MG/2ML
INJECTION, SOLUTION SUBCUTANEOUS
Qty: 2 | Refills: 6 | Status: ERX

## 2025-03-31 SDOH — ECONOMIC STABILITY: TRANSPORTATION INSECURITY
IN THE PAST 12 MONTHS, HAS LACK OF TRANSPORTATION KEPT YOU FROM MEETINGS, WORK, OR FROM GETTING THINGS NEEDED FOR DAILY LIVING?: NO

## 2025-03-31 SDOH — HEALTH STABILITY: PHYSICAL HEALTH: ON AVERAGE, HOW MANY MINUTES DO YOU ENGAGE IN EXERCISE AT THIS LEVEL?: 50 MIN

## 2025-03-31 SDOH — ECONOMIC STABILITY: FOOD INSECURITY: WITHIN THE PAST 12 MONTHS, YOU WORRIED THAT YOUR FOOD WOULD RUN OUT BEFORE YOU GOT MONEY TO BUY MORE.: NEVER TRUE

## 2025-03-31 SDOH — ECONOMIC STABILITY: FOOD INSECURITY: WITHIN THE PAST 12 MONTHS, THE FOOD YOU BOUGHT JUST DIDN'T LAST AND YOU DIDN'T HAVE MONEY TO GET MORE.: NEVER TRUE

## 2025-03-31 SDOH — ECONOMIC STABILITY: INCOME INSECURITY: IN THE LAST 12 MONTHS, WAS THERE A TIME WHEN YOU WERE NOT ABLE TO PAY THE MORTGAGE OR RENT ON TIME?: NO

## 2025-03-31 SDOH — HEALTH STABILITY: PHYSICAL HEALTH: ON AVERAGE, HOW MANY DAYS PER WEEK DO YOU ENGAGE IN MODERATE TO STRENUOUS EXERCISE (LIKE A BRISK WALK)?: 6 DAYS

## 2025-03-31 SDOH — ECONOMIC STABILITY: TRANSPORTATION INSECURITY
IN THE PAST 12 MONTHS, HAS THE LACK OF TRANSPORTATION KEPT YOU FROM MEDICAL APPOINTMENTS OR FROM GETTING MEDICATIONS?: NO

## 2025-03-31 ASSESSMENT — LIFESTYLE VARIABLES
HOW MANY STANDARD DRINKS CONTAINING ALCOHOL DO YOU HAVE ON A TYPICAL DAY: 1
HOW OFTEN DO YOU HAVE SIX OR MORE DRINKS ON ONE OCCASION: 1
HOW MANY STANDARD DRINKS CONTAINING ALCOHOL DO YOU HAVE ON A TYPICAL DAY: 1 OR 2
HOW OFTEN DO YOU HAVE A DRINK CONTAINING ALCOHOL: 2-3 TIMES A WEEK
HOW OFTEN DO YOU HAVE A DRINK CONTAINING ALCOHOL: 4

## 2025-03-31 ASSESSMENT — PATIENT HEALTH QUESTIONNAIRE - PHQ9
SUM OF ALL RESPONSES TO PHQ QUESTIONS 1-9: 0
SUM OF ALL RESPONSES TO PHQ QUESTIONS 1-9: 0
2. FEELING DOWN, DEPRESSED OR HOPELESS: NOT AT ALL
SUM OF ALL RESPONSES TO PHQ QUESTIONS 1-9: 0
SUM OF ALL RESPONSES TO PHQ QUESTIONS 1-9: 0
1. LITTLE INTEREST OR PLEASURE IN DOING THINGS: NOT AT ALL

## 2025-04-01 ENCOUNTER — HOSPITAL ENCOUNTER (OUTPATIENT)
Age: 65
Setting detail: SPECIMEN
Discharge: HOME | End: 2025-04-01

## 2025-04-01 ENCOUNTER — OFFICE VISIT (OUTPATIENT)
Dept: FAMILY MEDICINE CLINIC | Age: 65
End: 2025-04-01

## 2025-04-01 VITALS
HEIGHT: 66 IN | DIASTOLIC BLOOD PRESSURE: 70 MMHG | SYSTOLIC BLOOD PRESSURE: 104 MMHG | HEART RATE: 58 BPM | BODY MASS INDEX: 21.28 KG/M2 | WEIGHT: 132.4 LBS

## 2025-04-01 DIAGNOSIS — D72.829 LEUKOCYTOSIS, UNSPECIFIED TYPE: ICD-10-CM

## 2025-04-01 DIAGNOSIS — R53.82 CHRONIC FATIGUE: ICD-10-CM

## 2025-04-01 DIAGNOSIS — E53.8 VITAMIN B12 DEFICIENCY: ICD-10-CM

## 2025-04-01 DIAGNOSIS — Z00.00 MEDICARE ANNUAL WELLNESS VISIT, SUBSEQUENT: Primary | ICD-10-CM

## 2025-04-01 DIAGNOSIS — E87.6 HYPOKALEMIA: ICD-10-CM

## 2025-04-01 DIAGNOSIS — E55.9 VITAMIN D DEFICIENCY: ICD-10-CM

## 2025-04-01 DIAGNOSIS — Z12.11 SCREEN FOR COLON CANCER: ICD-10-CM

## 2025-04-01 DIAGNOSIS — E78.5 DYSLIPIDEMIA: ICD-10-CM

## 2025-04-01 LAB
25(OH)D3 SERPL-MCNC: 48.9 NG/ML (ref 30–100)
ALBUMIN SERPL-MCNC: 4.7 G/DL (ref 3.5–5.2)
ALBUMIN/GLOB SERPL: 2.1 {RATIO} (ref 1–2.5)
ALP SERPL-CCNC: 58 U/L (ref 35–104)
ALT SERPL-CCNC: 19 U/L (ref 10–35)
ANION GAP SERPL CALCULATED.3IONS-SCNC: 10 MMOL/L (ref 9–16)
AST SERPL-CCNC: 27 U/L (ref 10–35)
BASOPHILS # BLD: 0.04 K/UL (ref 0–0.2)
BASOPHILS NFR BLD: 1 % (ref 0–2)
BILIRUB SERPL-MCNC: 0.7 MG/DL (ref 0–1.2)
BUN SERPL-MCNC: 14 MG/DL (ref 8–23)
CALCIUM SERPL-MCNC: 9.3 MG/DL (ref 8.6–10.4)
CHLORIDE SERPL-SCNC: 104 MMOL/L (ref 98–107)
CHOLEST SERPL-MCNC: 257 MG/DL (ref 0–199)
CHOLESTEROL/HDL RATIO: 2.5
CO2 SERPL-SCNC: 27 MMOL/L (ref 20–31)
CREAT SERPL-MCNC: 0.9 MG/DL (ref 0.6–0.9)
EOSINOPHIL # BLD: 0.06 K/UL (ref 0–0.44)
EOSINOPHILS RELATIVE PERCENT: 1 % (ref 1–4)
ERYTHROCYTE [DISTWIDTH] IN BLOOD BY AUTOMATED COUNT: 12.8 % (ref 11.8–14.4)
GFR, ESTIMATED: 71 ML/MIN/1.73M2
GLUCOSE SERPL-MCNC: 96 MG/DL (ref 74–99)
HCT VFR BLD AUTO: 42.1 % (ref 36.3–47.1)
HDLC SERPL-MCNC: 103 MG/DL
HGB BLD-MCNC: 13.9 G/DL (ref 11.9–15.1)
IMM GRANULOCYTES # BLD AUTO: <0.03 K/UL (ref 0–0.3)
IMM GRANULOCYTES NFR BLD: 0 %
LDLC SERPL CALC-MCNC: 145 MG/DL (ref 0–100)
LYMPHOCYTES NFR BLD: 2.14 K/UL (ref 1.1–3.7)
LYMPHOCYTES RELATIVE PERCENT: 42 % (ref 24–43)
MCH RBC QN AUTO: 30.5 PG (ref 25.2–33.5)
MCHC RBC AUTO-ENTMCNC: 33 G/DL (ref 28.4–34.8)
MCV RBC AUTO: 92.5 FL (ref 82.6–102.9)
MONOCYTES NFR BLD: 0.32 K/UL (ref 0.1–1.2)
MONOCYTES NFR BLD: 6 % (ref 3–12)
NEUTROPHILS NFR BLD: 50 % (ref 36–65)
NEUTS SEG NFR BLD: 2.54 K/UL (ref 1.5–8.1)
NRBC BLD-RTO: 0 PER 100 WBC
PLATELET # BLD AUTO: 269 K/UL (ref 138–453)
PMV BLD AUTO: 10 FL (ref 8.1–13.5)
POTASSIUM SERPL-SCNC: 4.4 MMOL/L (ref 3.7–5.3)
PROT SERPL-MCNC: 6.9 G/DL (ref 6.6–8.7)
RBC # BLD AUTO: 4.55 M/UL (ref 3.95–5.11)
SODIUM SERPL-SCNC: 141 MMOL/L (ref 136–145)
TRIGL SERPL-MCNC: 45 MG/DL
VIT B12 SERPL-MCNC: 578 PG/ML (ref 232–1245)
VLDLC SERPL CALC-MCNC: 9 MG/DL (ref 1–30)
WBC OTHER # BLD: 5.1 K/UL (ref 3.5–11.3)

## 2025-04-01 RX ORDER — AVOBENZONE, HOMOSALATE, OCTISALATE, OCTOCRYLENE, OXYBENZONE 30; 130; 50; 70; 40 MG/ML; MG/ML; MG/ML; MG/ML; MG/ML
1 LOTION TOPICAL
Qty: 90 TABLET | Refills: 3
Start: 2025-04-01

## 2025-04-01 RX ORDER — MAGNESIUM GLYCINATE 100 MG
300 CAPSULE ORAL
Qty: 90 CAPSULE | Refills: 3
Start: 2025-04-01

## 2025-04-01 NOTE — PROGRESS NOTES
Medicare Annual Wellness Visit    Melinda Renteria is here for Medicare AWV    Assessment & Plan   Medicare annual wellness visit, subsequent  Vitamin D deficiency  -     Vitamin D 25 Hydroxy; Future  Dyslipidemia  -     Comprehensive Metabolic Panel; Future  -     Lipid Panel; Future  Vitamin B12 deficiency  -     Vitamin B12; Future  Chronic fatigue  -     Comprehensive Metabolic Panel; Future  -     CBC with Auto Differential; Future  Leukocytosis, unspecified type  -     CBC with Auto Differential; Future  Hypokalemia  -     Comprehensive Metabolic Panel; Future  Screen for colon cancer       No follow-ups on file.     Subjective       Patient's complete Health Risk Assessment and screening values have been reviewed and are found in Flowsheets. The following problems were reviewed today and where indicated follow up appointments were made and/or referrals ordered.    Positive Risk Factor Screenings with Interventions:                    Safety:  Do you have non-slip mats or non-slip surfaces or shower bars or grab bars in your shower or bathtub?: (!) (Patient-Rptd) No  Interventions:  See AVS for additional education material                   Objective   Vitals:    04/01/25 0831   BP: 104/70   Pulse: 58   Weight: 60.1 kg (132 lb 6.4 oz)   Height: 1.676 m (5' 6\")      Body mass index is 21.37 kg/m².                  Allergies   Allergen Reactions    Macrodantin [Nitrofurantoin] Hives    Sulfa Antibiotics Hives and Other (See Comments)     Itchy throat     Prior to Visit Medications    Medication Sig Taking? Authorizing Provider   Magnesium Glycinate 100 MG CAPS Take 300 mg by mouth daily (with breakfast) Yes Tawanna Benito MD   B Complex-Biotin-FA (B COMPLEX 100 TR) TBCR Take 1 tablet by mouth daily (with breakfast) Yes Tawanna Benito MD   valACYclovir (VALTREX) 1 g tablet Take 1 tablet by mouth daily Yes Tawanna Benito MD   Omega-3 Fatty Acids (FISH OIL) 500 MG CAPS Take 500 mg by mouth 2 times daily Yes

## 2025-04-02 ENCOUNTER — RESULTS FOLLOW-UP (OUTPATIENT)
Dept: FAMILY MEDICINE CLINIC | Age: 65
End: 2025-04-02

## 2025-04-10 RX ORDER — DUPILUMAB 300 MG/2ML
INJECTION, SOLUTION SUBCUTANEOUS
Qty: 2 | Refills: 6 | Status: CANCELLED
Stop reason: CLARIF

## 2025-04-29 ENCOUNTER — TELEPHONE (OUTPATIENT)
Dept: FAMILY MEDICINE CLINIC | Age: 65
End: 2025-04-29

## 2025-04-29 DIAGNOSIS — Z12.31 ENCOUNTER FOR SCREENING MAMMOGRAM FOR MALIGNANT NEOPLASM OF BREAST: Primary | ICD-10-CM

## 2025-05-19 ENCOUNTER — HOSPITAL ENCOUNTER (OUTPATIENT)
Dept: MAMMOGRAPHY | Age: 65
Discharge: HOME | End: 2025-05-21
Payer: MEDICARE

## 2025-05-19 ENCOUNTER — RESULTS FOLLOW-UP (OUTPATIENT)
Dept: FAMILY MEDICINE CLINIC | Age: 65
End: 2025-05-19

## 2025-05-19 DIAGNOSIS — Z12.31 ENCOUNTER FOR SCREENING MAMMOGRAM FOR MALIGNANT NEOPLASM OF BREAST: ICD-10-CM

## 2025-05-19 PROCEDURE — 77067 SCR MAMMO BI INCL CAD: CPT

## (undated) DEVICE — STANDARD HYPODERMIC NEEDLE,POLYPROPYLENE HUB: Brand: MONOJECT

## (undated) DEVICE — TOWEL,OR,DSP,ST,BLUE,STD,4/PK,20PK/CS: Brand: MEDLINE

## (undated) DEVICE — CONTROL SYRINGE LUER-LOCK TIP: Brand: MONOJECT

## (undated) DEVICE — PAD,ABDOMINAL,5"X9",ST,LF,25/BX: Brand: MEDLINE INDUSTRIES, INC.

## (undated) DEVICE — SURGICAL PROCEDURE KIT BASIN MAJ SET UP

## (undated) DEVICE — GLOVE SURG SZ 8 L12IN FNGR THK87MIL WHT LTX FREE

## (undated) DEVICE — GOWN,AURORA,NON-REINFORCED,2XL: Brand: MEDLINE

## (undated) DEVICE — TRAY PREP DRY W/ PREM GLV 2 APPL 6 SPNG 2 UNDPD 1 OVERWRAP

## (undated) DEVICE — GLOVE SURG SZ 75 L12IN FNGR THK87MIL WHT LTX FREE

## (undated) DEVICE — TRANS-A-JET

## (undated) DEVICE — YANKAUER,FLEXIBLE HANDLE,REGLR CAPACITY: Brand: MEDLINE INDUSTRIES, INC.

## (undated) DEVICE — INTENDED FOR TISSUE SEPARATION, AND OTHER PROCEDURES THAT REQUIRE A SHARP SURGICAL BLADE TO PUNCTURE OR CUT.: Brand: BARD-PARKER ® CARBON RIB-BACK BLADES

## (undated) DEVICE — SKIN PREP TRAY W/CHG: Brand: MEDLINE INDUSTRIES, INC.

## (undated) DEVICE — GLOVE SURG SZ 65 L12IN FNGR THK87MIL WHT LTX FREE

## (undated) DEVICE — Z DISCONTINUED USE 2271144 DRAIN SURG W0.25XL18IN PRECUT UNIF WALL THICKNESS PENROSE

## (undated) DEVICE — DRESSING,GAUZE,XEROFORM,CURAD,1"X8",ST: Brand: CURAD

## (undated) DEVICE — GAUZE,SPONGE,FLUFF,6"X6.75",STRL,5/TRAY: Brand: MEDLINE

## (undated) DEVICE — DRAPE,REIN 53X77,STERILE: Brand: MEDLINE